# Patient Record
Sex: MALE
[De-identification: names, ages, dates, MRNs, and addresses within clinical notes are randomized per-mention and may not be internally consistent; named-entity substitution may affect disease eponyms.]

---

## 2019-06-21 ENCOUNTER — HOSPITAL ENCOUNTER (EMERGENCY)
Dept: HOSPITAL 97 - ER | Age: 64
Discharge: HOME | End: 2019-06-21
Payer: COMMERCIAL

## 2019-06-21 VITALS — DIASTOLIC BLOOD PRESSURE: 50 MMHG | OXYGEN SATURATION: 100 % | SYSTOLIC BLOOD PRESSURE: 156 MMHG

## 2019-06-21 VITALS — TEMPERATURE: 97.9 F

## 2019-06-21 DIAGNOSIS — E11.22: ICD-10-CM

## 2019-06-21 DIAGNOSIS — Z99.2: ICD-10-CM

## 2019-06-21 DIAGNOSIS — I12.0: ICD-10-CM

## 2019-06-21 DIAGNOSIS — D63.1: Primary | ICD-10-CM

## 2019-06-21 DIAGNOSIS — N18.6: ICD-10-CM

## 2019-06-21 DIAGNOSIS — Z79.4: ICD-10-CM

## 2019-06-21 LAB
ANISOCYTOSIS BLD QL: (no result)
BLD SMEAR INTERP: (no result)
BUN BLD-MCNC: 23 MG/DL (ref 7–18)
GLUCOSE SERPLBLD-MCNC: 186 MG/DL (ref 74–106)
HCT VFR BLD CALC: 21.1 % (ref 39.6–49)
HYPOCHROMIA BLD QL: (no result)
LYMPHOCYTES # SPEC AUTO: 1.1 K/UL (ref 0.7–4.9)
MACROCYTES BLD QL: (no result)
MORPHOLOGY BLD-IMP: (no result)
PMV BLD: 8.3 FL (ref 7.6–11.3)
POLYCHROMASIA BLD QL SMEAR: (no result)
POTASSIUM SERPL-SCNC: 3.2 MMOL/L (ref 3.5–5.1)
RBC # BLD: 1.96 M/UL (ref 4.33–5.43)

## 2019-06-21 PROCEDURE — 82272 OCCULT BLD FECES 1-3 TESTS: CPT

## 2019-06-21 PROCEDURE — 86900 BLOOD TYPING SEROLOGIC ABO: CPT

## 2019-06-21 PROCEDURE — 86850 RBC ANTIBODY SCREEN: CPT

## 2019-06-21 PROCEDURE — 86901 BLOOD TYPING SEROLOGIC RH(D): CPT

## 2019-06-21 PROCEDURE — 99283 EMERGENCY DEPT VISIT LOW MDM: CPT

## 2019-06-21 PROCEDURE — 36415 COLL VENOUS BLD VENIPUNCTURE: CPT

## 2019-06-21 PROCEDURE — 85025 COMPLETE CBC W/AUTO DIFF WBC: CPT

## 2019-06-21 PROCEDURE — 80048 BASIC METABOLIC PNL TOTAL CA: CPT

## 2019-06-21 NOTE — ER
Nurse's Notes                                                                                     

 [facility]                                                                 

Name: Kyle Chan                                                                                  

Age: 63 yrs                                                                                       

Sex: Male                                                                                         

: 1955                                                                                   

MRN: [id number]                                                                                   

Arrival Date: 2019                                                                          

Time: 10:12                                                                                       

Account#: [id number]                                                                            

Bed 6                                                                                             

Private MD: Dustin Baugh R                                                         

Diagnosis: Anemia in chronic kidney disease                                                       

                                                                                                  

Presentation:                                                                                     

                                                                                             

10:30 Presenting complaint: Patient states: was sent here from [facility] for Hgb=6.6, iw  

      is usually above 8, has increased weakness, also states he has a "lump" in his right        

      groin that is supposed to be removed on  at [employer], was told it could             

      possibly have a small leak in it. Transition of care: patient was not received from         

      another setting of care. Onset of symptoms was 2019. Risk Assessment: Do you       

      want to hurt yourself or someone else? Patient reports no desire to harm self or            

      others. Initial Sepsis Screen: Does the patient meet any 2 criteria? No. Patient's          

      initial sepsis screen is negative. Does the patient have a suspected source of              

      infection? No. Patient's initial sepsis screen is negative. Care prior to arrival: None.    

10:30 Method Of Arrival: Wheelchair                                                           iw  

10:30 Acuity: KJ 3                                                                           iw  

                                                                                                  

Triage Assessment:                                                                                

10:40 General: Appears in no apparent distress. comfortable, Behavior is cooperative,         bp  

      appropriate for age, anxious. Pain: Denies pain. EENT: No deficits noted. Neuro: Level      

      of Consciousness is awake, alert, obeys commands, Oriented to person, place, time,          

      situation, Appropriate for age. Cardiovascular: No deficits noted. Respiratory: Airway      

      is patent Respiratory effort is even, unlabored, Respiratory pattern is regular,            

      symmetrical. GI: No signs and/or symptoms were reported involving the gastrointestinal      

      system. : No signs and/or symptoms were reported regarding the genitourinary system.      

      Derm: No deficits noted. Musculoskeletal: Circulation, motion, and sensation intact.        

      Range of motion: intact in all extremities.                                                 

                                                                                                  

Historical:                                                                                       

- Allergies:                                                                                      

10:40 No Known Allergies;                                                                     iw  

- PMHx:                                                                                           

10:40 Dialysis; Hypertension; Hyperlipidemia; Diabetes - IDDM;                                iw  

- PSHx:                                                                                           

10:40 left foot;                                                                              iw  

                                                                                                  

- Immunization history:: Adult Immunizations up to date.                                          

- Social history:: Smoking status: Patient/guardian denies using tobacco.                         

- Ebola Screening: : Patient negative for fever greater than or equal to 101.5 degrees            

  Fahrenheit, and additional compatible Ebola Virus Disease symptoms Patient denies               

  exposure to infectious person Patient denies travel to an Ebola-affected area in the            

  21 days before illness onset No symptoms or risks identified at this time.                      

                                                                                                  

                                                                                                  

Screening:                                                                                        

10:45 Abuse screen: Denies threats or abuse. Denies injuries from another. Nutritional        bp  

      screening: No deficits noted. Tuberculosis screening: No symptoms or risk factors           

      identified. Fall Risk None identified.                                                      

                                                                                                  

Assessment:                                                                                       

10:40 General: SEE TRIAGE NOTE.                                                               bp  

12:13 Reassessment: ALL CURRENT ORDERS COMPLETED, RESULTS PENDING.                            bp  

13:00 Reassessment: CONSENT FOR PRBC SIGNED/WITNESSED, TRANSFUSION STARTED AT 1300. PT ON     bp  

      NIBP AND CONTINUOUS SPO2.                                                                   

14:00 Reassessment: PRBC TRANSFUSION IN PROCESS.                                              bp  

15:44 Reassessment: PT D/C HOME AMBULATORY WITH FAMILY, DX WITH ANEMIA IN CHRONIC KIDNEY      bp  

      DISEASE.                                                                                    

                                                                                                  

Vital Signs:                                                                                      

10:41  / 42; Pulse 62; Resp 16; Temp 97.9; Pulse Ox 99% on R/A; Weight 81.65 kg; Height iw  

      5 ft. 8 in. (172.72 cm); Pain 0/10;                                                         

12:13  / 50; Pulse 60; Resp 16; Pulse Ox 100% ;                                         bp  

13:00  / 39; Pulse 51; Resp 14; Temp 97.8; Pulse Ox 98% ;                               bp  

14:00  / 42; Pulse 52; Resp 14; Temp 97.8; Pulse Ox 99% ;                               bp  

15:44  / 57; Pulse 67; Resp 16; Temp 98; Pulse Ox 100% ;                                bp  

10:41 Body Mass Index 27.37 (81.65 kg, 172.72 cm)                                             iw  

                                                                                                  

ED Course:                                                                                        

10:12 Patient arrived in ED.                                                                  mr  

10:12 Dustin Baugh MD is Private Physician.                                    mr  

10:18 [facility]                                                               [id number] 

10:18 Dimitri Barbosa MD is Attending Physician.                                                jr8 

10:25 Jose Whipple, RN is Primary Nurse.                                                    bp  

10:38 Triage completed.                                                                       iw  

10:40 Arm band placed on.                                                                     iw  

10:45 Patient has correct armband on for positive identification. Bed in low position. Call   bp  

      light in reach. Side rails up X2. Adult w/ patient.                                         

10:45 Inserted saline lock: 20 gauge in right forearm, using aseptic technique. Blood         bp  

      collected.                                                                                  

15:28 Dustin Baugh MD is Referral Physician.                                   [email or web address] 

15:44 No provider procedures requiring assistance completed. IV discontinued, intact,         bp  

      bleeding controlled, No redness/swelling at site. Pressure dressing applied.                

                                                                                                  

Administered Medications:                                                                         

No medications were administered                                                                  

                                                                                                  

                                                                                                  

Outcome:                                                                                          

15:28 Discharge ordered by MD.                                                                [id number] 

15:45 Discharged to home ambulatory, with family.                                             bp  

15:45 Condition: stable                                                                           

15:45 Discharge instructions given to patient, Instructed on discharge instructions, follow       

      up and referral plans. Demonstrated understanding of instructions, follow-up care.          

15:46 Patient left the ED.                                                                    bp  

                                                                                                  

Signatures:                                                                                       

Jocelyne Bernabe Irene, RN                     RN   iw                                                   

Santiago Soto PA PA   jr8                                                  

Jose Whipple, RN                      RN   bp                                                   

                                                                                                  

**************************************************************************************************

## 2019-06-21 NOTE — XMS REPORT
Encounter CCD: 2013 to 2013

 Created on:2057



Patient:HOMER SOARES

Sex:Male

:1955

External Reference #:[id number]





Demographics







 Address  [address]



   [place] [address]-

 

 Home Phone  [phone]

 

 Preferred Language  Unknown

 

 Marital Status  

 

 Mosque Affiliation  Buddhist

 

 Race  Other

 

 Ethnic Group  









Author







 Organization  [facility]









Care Team Providers







 Name  Role  Phone

 

 Linda West  Referring Provider  [phone]









Allergies, Adverse Reactions, Alerts







 Substance  Reaction  Status

 

 NKDA    Active







Problem List







 Condition  Effective Dates  Status

 

 Diabetes    Resolved

 

 Hypertension    Resolved

 

 renal failure    Resolved







Results

BEDSIDE GLUCOSE TESTING





 Most recent to oldest [Reference Range]:  1

 

 Gluc POC Lifscn [70-99 mg/dL]  193 mg/dL 1



   *HI*



   (2013 08:40:00)

 

 Comment1  Notify RN/MD



   *NA*



   (2013 08:40:00)



1Interpretive Data:  Upper Reportable Limit: 200 mg/dL.CHEMISTRY





 Most recent to oldest [Reference Range]:  1

 

 POC Potassium [3.5-5.1 mEq/L]  5.4 mEq/L



   *HI*



   (2013 08:41:00)

## 2019-06-21 NOTE — EDPHYS
Physician Documentation                                                                           

 [facility]                                                                 

Name: Kyle Chan                                                                                  

Age: 63 yrs                                                                                       

Sex: Male                                                                                         

: 1955                                                                                   

MRN: [id number]                                                                                   

Arrival Date: 2019                                                                          

Time: 10:12                                                                                       

Account#: [id number]                                                                            

Bed 6                                                                                             

Private MD: Dustin Baugh R                                                         

ED Physician Dimitri Barbosa                                                                         

HPI:                                                                                              

                                                                                             

10:42 This 63 yrs old  Male presents to ER via Wheelchair with complaints of Abnormal jr8 

      Lab Results.                                                                                

10:42 Patient with history of CKD. Stated that at dialysis this morning they told him his H/H jr8 

      was low and to be evaluated. Stated that he had slight more fatigue this week then          

      normal. Denies BRBPR or melena. Severity of symptoms: At their worst the symptoms were      

      mild in the emergency department the symptoms are unchanged. It is unknown whether or       

      not the patient has had similar symptoms in the past. The patient has been recently         

      seen by a physician:.                                                                       

                                                                                                  

Historical:                                                                                       

- Allergies:                                                                                      

10:40 No Known Allergies;                                                                     iw  

- PMHx:                                                                                           

10:40 Dialysis; Hypertension; Hyperlipidemia; Diabetes - IDDM;                                iw  

- PSHx:                                                                                           

10:40 left foot;                                                                              iw  

                                                                                                  

- Immunization history:: Adult Immunizations up to date.                                          

- Social history:: Smoking status: Patient/guardian denies using tobacco.                         

- Ebola Screening: : Patient negative for fever greater than or equal to 101.5 degrees            

  Fahrenheit, and additional compatible Ebola Virus Disease symptoms Patient denies               

  exposure to infectious person Patient denies travel to an Ebola-affected area in the            

  21 days before illness onset No symptoms or risks identified at this time.                      

                                                                                                  

                                                                                                  

ROS:                                                                                              

10:42 Eyes: Negative for injury, pain, redness, and discharge, ENT: Negative for injury,      jr8 

      pain, and discharge, Neck: Negative for injury, pain, and swelling, Cardiovascular:         

      Negative for chest pain, palpitations, and edema, Respiratory: Negative for shortness       

      of breath, cough, wheezing, and pleuritic chest pain, Abdomen/GI: Negative for              

      abdominal pain, nausea, vomiting, diarrhea, and constipation, Back: Negative for injury     

      and pain, MS/Extremity: Negative for injury and deformity, Skin: Negative for injury,       

      rash, and discoloration, Neuro: Negative for headache, weakness, numbness, tingling,        

      and seizure.                                                                                

10:42 Constitutional: Positive for fatigue.                                                       

                                                                                                  

Exam:                                                                                             

10:42 Eyes:  Pupils equal round and reactive to light, extra-ocular motions intact.  Lids and jr8 

      lashes normal.  Conjunctiva and sclera are non-icteric and not injected.  Cornea within     

      normal limits.  Periorbital areas with no swelling, redness, or edema. ENT:  Nares          

      patent. No nasal discharge, no septal abnormalities noted.  Tympanic membranes are          

      normal and external auditory canals are clear.  Oropharynx with no redness, swelling,       

      or masses, exudates, or evidence of obstruction, uvula midline.  Mucous membranes           

      moist. Neck:  Trachea midline, no thyromegaly or masses palpated, and no cervical           

      lymphadenopathy.  Supple, full range of motion without nuchal rigidity, or vertebral        

      point tenderness.  No Meningismus. Cardiovascular:  Regular rate and rhythm with a          

      normal S1 and S2.  No gallops, murmurs, or rubs.  Normal PMI, no JVD.  No pulse             

      deficits. Respiratory:  Lungs have equal breath sounds bilaterally, clear to                

      auscultation and percussion.  No rales, rhonchi or wheezes noted.  No increased work of     

      breathing, no retractions or nasal flaring. Abdomen/GI:  Soft, non-tender, with normal      

      bowel sounds.  No distension or tympany.  No guarding or rebound.  No evidence of           

      tenderness throughout. Brown stool with a negative guaiac Back:  No spinal tenderness.      

      No costovertebral tenderness.  Full range of motion. Skin:  Warm, dry with normal           

      turgor.  Normal color with no rashes, no lesions, and no evidence of cellulitis. MS/        

      Extremity:  Pulses equal, no cyanosis.  Neurovascular intact.  Full, normal range of        

      motion. Neuro:  Awake and alert, GCS 15, oriented to person, place, time, and               

      situation.  Cranial nerves II-XII grossly intact.  Motor strength 5/5 in all                

      extremities.  Sensory grossly intact.  Cerebellar exam normal.  Normal gait.                

                                                                                                  

Vital Signs:                                                                                      

10:41  / 42; Pulse 62; Resp 16; Temp 97.9; Pulse Ox 99% on R/A; Weight 81.65 kg; Height iw  

      5 ft. 8 in. (172.72 cm); Pain 0/10;                                                         

12:13  / 50; Pulse 60; Resp 16; Pulse Ox 100% ;                                         bp  

13:00  / 39; Pulse 51; Resp 14; Temp 97.8; Pulse Ox 98% ;                               bp  

14:00  / 42; Pulse 52; Resp 14; Temp 97.8; Pulse Ox 99% ;                               bp  

15:44  / 57; Pulse 67; Resp 16; Temp 98; Pulse Ox 100% ;                                bp  

10:41 Body Mass Index 27.37 (81.65 kg, 172.72 cm)                                             iw  

                                                                                                  

MDM:                                                                                              

10:18 Patient medically screened.                                                             [facility] 

15:27 Data reviewed: vital signs, nurses notes, lab test result(s), and as a result, I will   jr8 

      discharge patient. Data interpreted: Pulse oximetry: on room air is 99 %.                   

      Interpretation: normal. Counseling: I had a detailed discussion with the patient and/or     

      guardian regarding: the historical points, exam findings, and any diagnostic results        

      supporting the discharge/admit diagnosis, lab results, the need for outpatient follow       

      up, a family practitioner, to return to the emergency department if symptoms worsen or      

      persist or if there are any questions or concerns that arise at home. Response to           

      treatment: the patient's symptoms have markedly improved after treatment, and as a          

      result, I will discharge patient.                                                           

                                                                                                  

                                                                                             

10:37 Order name: CBC with Diff                                                               [facility] 

                                                                                             

10:37 Order name: T\T\S                                                                         [facility]

                                                                                             

10:37 Order name: Basic Metabolic Panel                                                       [facility] 

                                                                                             

10:42 Order name: Bb Add On                                                                     

                                                                                             

10:47 Order name: Occult Blood--Ancillary                                                       

                                                                                             

11:11 Order name: Occult Blood--Ancillary; Complete Time: 11:59                               [facility]

                                                                                             

10:37 Order name: IV; Complete Time: 10:51                                                    [facility] 

                                                                                             

11:20 Order name: Basic Metabolic Panel; Complete Time: 11:59                                 [facility]

                                                                                             

11:25 Order name: CBC with Automated Diff; Complete Time: 12:21                               [facility]

                                                                                             

11:52 Order name: Type and Screen                                                             [facility]

                                                                                             

12:13 Order name: CBC Smear Scan; Complete Time: 12:21                                        [facility]

                                                                                             

12:54 Order name: ABO/RH no charge; Complete Time: 13:09                                      [facility]

                                                                                                  

Administered Medications:                                                                         

No medications were administered                                                                  

                                                                                                  

                                                                                                  

Disposition:                                                                                      

16:23 Co-signature as Attending Physician, Dimitri Barbosa MD.                                    rn  

                                                                                                  

Disposition:                                                                                      

19 15:28 Discharged to Home. Impression: Anemia in chronic kidney disease.                  

- Condition is Stable.                                                                            

- Discharge Instructions: Anemia, Nonspecific, Blood Transfusion, Adult.                          

                                                                                                  

- Medication Reconciliation Form, Thank You Letter, Antibiotic Education, Prescription            

  Opioid Use form.                                                                                

- Follow up: Dustin Baugh MD; When: 2 - 3 days; Reason: Recheck today's            

  complaints, Continuance of care, Re-evaluation by your physician.                               

- Problem is new.                                                                                 

- Symptoms have improved.                                                                         

                                                                                                  

                                                                                                  

                                                                                                  

Signatures:                                                                                       

Dispatcher [employer]                           EDVerona Frausto RN                     Dimitri Richards MD MD rn Roszak, Josh, PA                        PA   jr8                                                  

Jose Whipple RN                      RN   bp                                                   

                                                                                                  

Corrections: (The following items were deleted from the chart)                                    

15:46 15:28 2019 15:28 Discharged to Home. Impression: Anemia in chronic kidney         bp  

      disease. Condition is Stable. Forms are Medication Reconciliation Form, Thank You           

      Letter, Antibiotic Education, Prescription Opioid Use. Follow up: Dustin Baugh; When: 2 - 3 days; Reason: Recheck today's complaints, Continuance of care,     

      Re-evaluation by your physician. Problem is new. Symptoms have improved. jr8                

                                                                                                  

**************************************************************************************************

## 2019-06-21 NOTE — XMS REPORT
Continuity of Care Document

 Created on:May 7, 2018



Patient:HOMER SOARES

Sex:Male

:1955

External Reference #:[id number]





Demographics







 Address  [place] [address]

 

 Phone  [phone]

 

 Preferred Language  Unknown

 

 Marital Status  Unknown

 

 Worship Affiliation  Unknown

 

 Race  Unknown

 

 Ethnic Group  Unknown









Author







 Organization  [facility]









Problems







 Problem  Status  Onset  Classification  Date  Comments  Source



     Date    Reported    



             



             



             

 

 LABH  Active  20        [address]



             



             

 

 LAB  Active  20        [address]



             



             

 

 ABN STRESS TEST,  Active  20        [facility]



 CLAUDICATION    17        Medical



             Center



             



             

 

 CCL/LHC W/  Active  20        [facility]



 POSS/BILATERAL    [address] [place] [address] EXTREMIT            Center



             



             

 

 ESRD  Active  20        [address]



             



             

 

 BDDC/  Active  20        [address]



             



             

 

 COLONOSCOPY  Active  20        [facility]



 SCREENING    [address] [place] [address]



             Center



             



             

 

 UPDATE VISIT  Active  11/15/20        [address]



             



             

 

 CLAUDICATION  Active  20        [address]



             Center



             



             

 

 FOLLOW UP  Active  20        [address]



             



             

 

 RENAL PRE OPEN  Active  20        [facility]



 ACCT    15        [facility]



             Center



             



             

 

 CARDIAC CLEARNCE/  Active  20        [facility]



 PRE KIDNEY    15        Medical



 TRANSPLANT            Center



             



             

 

 PA KIDNEY ACCT  Active  20        [facility]



 FOR FC NOTES    15        Medical



 [place]            Center



             



             

 

 T-SPOT  Active  20        [address]



             



             

 

 ESRD**INCLUDE  Active  20        [facility]



 ILIACS**    14        Medical



             Center



             



             

 

 ESRD/ PRE  Active  20        [facility]



 TRANSPLANT WORK    14        Medical



 UP/  INCLUDE E            Center



             



             

 

 Discharge    04/08/20    04/10/2014    [facility]



 Diagnosis:    14        Medical



 Hypertension            Center



             



             

 

 HIGH BLOOD  Active  20        [facility]



 PRESSURE    14        Medical



             Center



             



             

 

 UPDATE  Active  20        [address]



             Center



             



             

 

 BDDC-RECTAL  Active  20        [facility]



 CANCER SCREENING    13        [facility]



             Center



             



             

 

 COLONSCOPY  Active  20        [facility]



 CLEARANCE    13        Medical



             Center



             



             

 

 KIDNEY TX/CARDIAC  Active  20        [facility]



 CLEARENCE PER    13        Medical



 JOSESITO GR            Center



             



             

 

 RENAL/DONOT USE  Active  20        [facility]



 THIS ACCT FOR    13        Medical



 CHARGES F/            Center



             



             

 

 PRE TRANSPLANT  Active  20        [facility]



 EVAL    [address] [place] [address]



             Center



             



             

 

 Pulmonary  Active  20  Problem  2017    [facility]



 hypertension            Medical



             Center,



             OP[facility]



             [facility]



             



             

 

 Diabetes  Resolved    Problem  2013    [facility]



             



             

 

 Hypertension  Resolved    Problem  2013    [facility]



             



             

 

 renal failure  Resolved    Problem  2015    [facility]



             



             

 

 DM II [Diabetes  Active    Problem  2016    [facility]



 mellitus type II]            Medical



             Center



             



             

 

 Essential  Active    Problem  2015    [facility]            Center



             



             

 

 Kidney transplant  Active    Problem  2017    [facility]



 evaluation            [facility],



             OFEZAKI [facility]



             



             

 

 Coronary artery  Active    Problem  2017    [facility]            Medical



             [place],



             OPIZAKI [facility]



             



             

 

 End-stage renal  Active    Problem  2017    [facility]



 disease (<span            Medical



 ID="[id number]"            Center,



 >Confirmed</span>            [facility]



 )            [facility]



             



             

 

 Essential  Active    Problem  2016    [facility]            Center



             



             

 

 History and  Active    Problem  2017    [facility]



 physical            Medical



 examination,            Center,



 annual for health            [facility]



 maintenance            [facility]



             



             

 

 Obesity  Active    Problem  2017    [facility],



             SANTIAGO [facility]



             



             

 

 Polyp of colon,  Active    Problem  2017    [facility],



             OPIZAKI [facility]



             



             

 

 DM II [Diabetes  Active    Problem  2017     [facility]



 mellitus type II]            [facility]



             



             

 

 Essential  Active    Problem  2017     [facility]



 pulmonary            [facility]



             



             

 

 SCREEN MAL  Active          [facility]



 NEOP-RECTUM            [facility]



             



             

 

 PRE-PROCEDURE LAB  Active          [facility]



             



             

 

 RENAL FAILURE NOS  Active          [facility]



             



             

 

 MEDICAL SERVICES  Active          [facility]



 NOT AVAILABLE IN            Medical



 HOME            Center



             



             

 

 PERIPHERAL  Active          [facility]



 VASCULAR DISEASE,            Medical



 UNSPECIFIED            Center



             



             

 

 ENCNTR FOR  Active          [facility]



 GENERAL ADULT            Medical



 MEDICAL EXAM W/            Center



             



             

 

 ENCOUNTER FOR  Active          [facility]



 PREPROCEDURAL            Medical



 LABORATORY E            Center



             



             







Medications







 Medication  Details  Route  Status  Patient  Ordering  Order  Source



         Instructions  Provider  Date  



               



               



               

 

 Aspirin Enteric  81 mg=1    Active        [facility]



 Coated 81 mg oral  tab, PO,          017  Medical



 delayed release  Daily, 0            Center



 tablet  Refill(s)            



               



               

 

 Famotidine  40 mg, PO,    Active        [facility]



   BID, # 60          017  Medical



   tab, 0            Center



   Refill(s)            



               



               

 

 famotidine 20  0 Refill(s)    Inactive        [facility]



 mg/5 mL-NaCl 0.9%            017  Medical



 intravenous              Center



 solution              



               

 

 GoLYTELY oral  See    Active        [facility]



 powder for  Instruction          017  Medical



 reconstitution  s, Take as            Center



   directed by            



   physician.,            



   # 1 ea, 0            



   Refill(s),            



   Pharmacy:            



   [facility]            



   Pharmacy            



   [id number]            



               



               

 

 valsartan 320 mg  320 mg=1    Active        [facility]



 oral tablet  tab, PO,          017  Medical



   Daily, # 30            Center



   tab, 0            



   Refill(s)            



               



               

 

 non-formulary  1 dose,    Active        [facility]



   Daily,          017  Medical



   ecotrin,            Center



   Refill(s) 0            



               



               

 

 24 HR Nifedipine  60 mg=1    Active         [place]



 60 MG Extended  tab, PO,          017  Medical



 Release Tablet  Daily, # 30            Center



   tab, 0            



   Refill(s)            



               



               

 

 NPH Insulin,  See Special    Active        [facility]



 Human 100 UNT/ML  Instruction          017  Medical



 Injectable  s, SUB-Q,            Center



 Suspension  BID, Inject            



 [Novolin N]   12 units            



   at 9am and            



   inject 8            



   units at            



   9pm, vial,            



   0 Refill(s)            



               



               

 

 minoxidil 2.5 mg  5 mg=2 tab,    Active        [facility]



 oral tablet  PO, BID, 0          017  Medical



   Refill(s)            Center



               



               

 

 Famotidine 20 MG  20 mg=1    Active        [facility]



 Oral Tablet  tab, PO,          017  Medical



   BID, 0            Center



   Refill(s)            



               



               

 

 labetalol 200 mg  3 tabs, PO,    Active        [facility]



 oral tablet  BID, 0          017  Medical



   Refill(s)            Center



               



               

 

 24 HR Nifedipine  60 mg=1    Active        [facility]



 60 MG Extended  tab, PO,          017  Medical



 Release Tablet  BID, 0            Center



   Refill(s)            



               



               

 

 clopidogrel 75 MG  75 mg=1    Active        [facility]



 Oral Tablet  tab, PO,          017  Medical



 [Plavix]  Daily, 0            Center



   Refill(s)            



               



               

 

 tramadol  See    Active        [facility]



 hydrochloride 50  Instruction          016  Medical



 MG Oral Tablet  s, 1 tab in            Center



   am and 1            



   tab in pm,            



   0 Refill(s)            



               



               

 

 NPH Insulin,  See    Active        [facility]



 Human 100 UNT/ML  Instruction          016  Medical



 Injectable  s, takes 15            Center



 Suspension  units in AM            



 [Humulin N]  and 10units            



   in PM, 0            



   Refill(s)            



               



               

 

 amLODIPine 10 mg  10 mg=1    Active        [facility]



 oral tablet  tab, PO,          016  Medical



   Daily, 0            Center



   Refill(s)            



               



               

 

 lovastatin 20 mg  20 mg=1    Active        [facility]



 oral tablet  tab, PO,          016  Medical



   Daily, 0            Center



   Refill(s)            



               



               

 

 Hydralazine  100 mg=1    Active         [place]



 Hydrochloride 100  tab, PO,          016  Medical



 MG Oral Tablet  TID, 0            Center



   Refill(s)            



               



               

 

 valsartan 320 MG  320 mg=1    Active         [facility]



 Oral Tablet  tab, PO,          016  Medical



 [Diovan]  Daily, 0            Center



   Refill(s)            



               



               

 

 labetalol 200 mg  See    Active         [facility]



 oral tablet  Instruction          015  Medical



   s, 3 tab PO            Center



   bid, 0            



   Refill(s)            



               



               

 

 gabapentin 300 MG  300 mg=1    Active         [facility]



 Oral Capsule  cap, PO,          015  Medical



   Daily, # 90            Center



   cap, 0            



   Refill(s)            



               



               

 

 valsartan 320 mg  320 mg=1    Active         [facility]



 oral tablet  tab, PO,          015  Medical



   Daily, # 30            Center



   tab, 0            



   Refill(s)            



               



               

 

 calcium acetate  1,334 mg=2    Active         [facility]



 667 MG Oral  cap, PO,          015  Medical



 Capsule [Phoslo]  TID, with            Center



   meals and 2            



   tabs po            



   with            



   snacks, 0            



   Refill(s)            



               



               

 

 omeprazole 20 mg  20 mg=1    Active        [facility]



 oral delayed  cap, PO,          014  Medical



 release capsule  Daily, # 30            Center



   cap, 1            



   Refill(s)            



               



               

 

 labetalol 200 mg  200 mg=1    Active         [facility]



 oral tablet  tab, PO,          014  Medical



   BID, # 60            Center



   tab, 1            



   Refill(s)            



               



               

 

 Labetalol  200 mg, 1    Inactive         [facility]



   tab, Route:          014  Medical



   PO, Drug            Center



   form: TAB,            



   ONCE,            



   Dosing            



   Weight            



   86.818, kg,            



   Start date:            



   14            



   16:23:00,            



   Stop date:            



   14            



   16:23:00Not            



   es: With            



   food. (Same            



   as:Trandate            



   ,            



   Normodyne)            



               



               

 

 Hydralazine  1 tab,    Inactive         [facility]



 Hydrochloride 100  Route: PO,          014  Medical



 MG Oral Tablet  ONCE,            Center



   Dosing            



   Weight            



   86.818, kg,            



   Start date:            



   14            



   16:23:00,            



   Stop date:            



   14            



   16:23:00            



               



               

 

 Clonidine  1 tab,    Inactive         [facility]



 Hydrochloride 0.3  Route: PO,          014  Medical



 MG Oral Tablet  ONCE,            Center



   Dosing            



   Weight            



   86.818, kg,            



   Start date:            



   14            



   16:22:00,            



   Stop date:            



   14            



   16:22:00            



               



               







Allergies, Adverse Reactions, Alerts







 Substance  Category  Reaction  Severity  Reaction  Status  Date  Comments  
Source



         type    Reported    



                 



                 







Immunizations







 Immunization  Date Given  Site  Status  Last Updated  Comments  Source



             



             







Results







 Order Name  Results  Value  Reference  Date  Interpretation  Comments  Source



       Range        



               



               



               

 

 Chest wo  Chest wo  EXAM: CT CHEST WITHOUT CONTRAST        -  [facility]



 contrast CT  contrast CT      /2017    -  Medical



               Center



               



     DATE: 2017        Read by:  Nisa Germain MD  



             Dictated Date/time:  17 08:16  



             Electronically Signed by:  Nisa Germain MD            
   17 08:30  



             FINAL REPORT  



     INDICATION: ESRD; PRE-TRANSPLANT WORK-UP          



               



               



               



     TECHNIQUE: Volumetric CT acquisition of the chest without contrast. Axial, 
sagittal and coronal reconstructions. Axial MIP images were reformatted at the 
scanner workstation.          



               



     IV contrast: None.          



               



     DLP: 812 mGy-cm          



               



               



               



     COMPARISON: Chest CT dated 2015          



               



               



               



     DISCUSSION:          



               



               



               



     Lines and Tubes: None.          



               



               



               



     Heart and Great Vessels: There is atherosclerotic calcification of the 
thoracic aorta right and left coronary arteries including the left anterior 
descending and left circumflex coronary arteries. Cardi          



     othoracic ratio measures 12.5/29 cm. There is no pleural or pericardial 
effusion.          



               



               



               



     Lymph Nodes: No hilar, mediastinal, axillary or internal mammary 
lymphadenopathy.          



               



               



               



     Lungs:  4 mm left upper lobe nodule on axial image 37, 3 mm right middle 
lobe nodule on axial image 108, 3 mm right lower lobe nodule on axial image 120 
and 2 mm right lower lobe nodule on axial image 1          



     35. These nodules are all stable since 2015. No new pulmonary 
nodules or masses.          



               



               



               



     Trachea and central bronchi are unremarkable.          



               



               



               



     Upper abdomen:  For comments below the diaphragm, I refer you to the 
abdomen and pelvic CT report from 2017.          



               



               



               



     Bones and Soft Tissues: Degenerative changes of the thoracic spine are 
noted with osteophytes.          



               



               



               



     IMPRESSION:          



               



               



               



     1.  4 mm left upper lobe nodule on axial image 37, 3 mm right middle lobe 
nodule on axial image 108, 3 mm right lower lobe nodule on axial image 120 and 
2 mm right lower lobe nodule on axial image 135.          



     These nodules are all stable since 2015. No new pulmonary nodules or 
masses.          



               



               



               



     2. Atherosclerotic calcification of the thoracic aorta right and left 
coronary arteries including the left anterior descending and left circumflex 
coronary arteries.          



               



               



               



               

 

 HEMATOLOGY  POC  39.0 %  42.0 -        [facility]



   Hematocrit    54.0        [facility]



               



               



               

 

 HEMATOLOGY  POC Potassium  3.6 meq/L  3.5 - 5.1        [facility]      [facility]



               



               



               

 

 HEMATOLOGY  POC Sodium  133 meq/L  135 - 145        [address]



               



               



               

 

 HEMATOLOGY  POC  13.3 g/dL  14.0 -        [facility]



   Hemoglobin    18.0        [facility]



               



               



               

 

 HEMATOLOGY  POC Glucose  122 mg/dL  70 - 99        [address]



               



               



               

 

 Abdomen/Pel  Abdomen/Pelvi  EXAM:  CT ABDOMEN AND PELVIS WITHOUT CONTRAST        -   [facility]



 vis wo IV  s wo IV          -  [facility]



 contrast CT  contrast CT            



               



     DATE: 2017 1:19 PM CST        Read by:  Faheem Bonilla MD  



             Dictated Date/time:  17 14:12  



             Electronically Signed by:  Faheem Bonilla MD              
   17 14:22  



             FINAL REPORT  



     INDICATION: Z01.818   Encounter for other preprocedural examination. Renal 
transplant evaluation.          



               



               



               



     ADDITIONAL INFORMATION: None.          



               



               



               



     COMPARISON: CT abdomen pelvis 2014 and renal ultrasound 2015  
        



               



               



               



     TECHNIQUE: Volumetric CT acquisition of the abdomen and pelvis without the 
intravenous administration contrast. Axial, coronal and sagittal 
reconstructions.          



               



               



               



               



               



     IV CONTRAST: None          



               



               ORAL CONTRAST: Water          



               



               RADIATION DOSE: Total DLP: 1172 mGy*cm          



               



                         Estimated effective dose: DLP x 0.015 mSv          



               



              COMPLICATIONS: None          



               



               



               



     FINDINGS:          



               



               



               



     Lines and tubes: None.          



               



               



               



     Lower thorax: Lung bases are clear. No pleural effusions. The heart is 
within normal limits in size. Coronary artery calcifications.          



               



               



               



     Liver: Mild pneumobilia is suggestion of a Riedel's lobe. A too small to 
characterize hypodensity within the right hepatic lobe.          



               



               



               



     Biliary tree: No intra- or extrahepatic biliary ductal dilation.          



               



               



               



     Gallbladder: Subtle hyperdensity may suggest tiny stones/sludge. No wall 
thickening or pericholecystic fluid.          



               



               



               



     Pancreas: Atrophic pancreas without ductal dilatation. No peripancreatic 
stranding.          



               



               



               



     Spleen: No splenomegaly.          



               



               



               



     Adrenals: No nodules.          



               



               



               



     Kidneys and ureters: Bilateral atrophic kidneys with prominent stranding 
but without fluid collections. No renal stones or hydronephrosis. No obvious 
cystic lesions.          



               



               



               



     No hydroureter.          



               



               



               



     Bladder: Bladder collapsed without radiopaque stones. Prominent prostate. 
         



               



               



               



     Gastrointestinal tract: Small hiatal hernia. The stomach is partially 
underdistended limiting optimal evaluation. No small or large bowel 
obstruction. Visualized appendix is within normal limits in size          



      without right lower quadrant fluid collections stranding. Moderate stool 
burden.          



               



               



               



     Peritoneum and retroperitoneum: No organized fluid collections or free 
air. Mild mesenteric stranding, not significant changed from prior exam.       
   



               



               



               



               



               



     Lymph nodes: Prominent periportal lymph node unchanged. No other 
lymphadenopathy.          



               



               



               



     Vasculature: IVC and abdominal aorta within emilio limits in caliber. 
Moderate vascular calcifications of the abdominal aorta and visceral branches. 
         



               



               



               



     Iliac vessels: Scattered vascular calcifications of the bilateral common 
iliac arteries. Scattered vascular calcifications of the bilateral distal 
external iliac arteries. Prominent calcifications of th          



     e bilateral internal iliac arteries. The iliac vessels are within normal 
limits in size without aneurysmal dilatation.          



               



               



               



     Bones: Multilevel degenerative changes of the thoracolumbar and 
lumbosacral spine with vacuum phenomena on the lumbosacral spine. Facet 
arthrosis.          



               



               



               



     Soft tissues: Small bilateral fat-containing inguinal hernias. Small fat-
containing umbilical hernia with diastasis of the rectus abdominis muscle. 
Nonspecific soft tissue stranding and edema.          



               



               



               



     IMPRESSION:          



               



     1.  Bilateral kidneys with persistent moderate perinephric stranding 
likely related to chronic renal disease.          



               



     2.  Persistent prominent periportal lymph node. No other lymphadenopathy. 
         



               



     3.  Bilateral iliac arteries and branches as detailed. No aneurysmal 
dilatation.          



               



     4.  Hepatomegaly.          



               



     5.  No bowel obstruction. Moderate stool burden.          



               



               



               



               

 

 REFERENCE  HLA Misc Test  See Report 1        Result  [facility]



 LAB RESULTS            Comment:  Medical



     (17 9:50 AM)        Reference lab  Center



             results  



             scanned in  



             [facility].  



             Results  



             displayed in  



             Results-Lab-R  



             [facility]  



             LAB-Outside  



             Lab Documents  



             (Imaged)  



             under  



             date/time  



             results were  



             scanned.  



             Report sent  



             for scanning  



             on  



             2017.  



               



               

 

 REFERENCE  Test Name  PRA          [facility]



 LAB RESULTS    RESULTS    /2017      Medical



               Center



               



               



               

 

 HVI VAS  HVI VAS  Reason for exam: Peripheral arterial disease        -  
[facility]



 Arterial  Arterial      /2016    -  Medical



 Upper or  Upper or            Center



 Lower  Lower single            



 single l  l  IMPRESSION:        Read by:  Cornado Pavon MD  



             Dictated Date/time:  01/15/16 11:12  



             Electronically Signed by:  Conrado Pavon MD                      
   01/15/16 11:14  



             FINAL REPORT  



     1. On the right the ankle/brachial index is 0.94.          



               



     2. On the left the ankle/brachial index is nonobtainable.          



               



               



               



     COMMENT:          



               



     Bilateral lower extremities segmental pressures with velocity waveform 
analysis was performed.          



               



               



               



     On the right, primarily triphasic waveforms are noted in the posterior 
tibial, and dorsalis pedis artery.  The right toe brachial index is 0.69.      
    



               



               



               



     On the left, primarily triphasic waveforms are noted in the posterior 
tibial, and dorsalis pedis artery.  The left toe brachial index is 0.68.       
   



               



               

 

 Retroperito  Retroperitone  EXAM: RENAL ULTRASOUND.      [facility]



 precious  al Complete      /    -  [facility]



 Complete US  US          This report was dictated by a Radiology Resident/
Fellow.  I have personally reviewed the images as  Center



             well as the Resident's interpretation and agree with the findings.
  



     DATE: 2015 at 1449 hours.        Read by:  Iban Mueller MD
            Resident:  Iban Mueller MD  



             Dictated Date/time:  12/22/15 14:56  



             Electronically Signed by:  Valeria Alcantar MD            
   12/22/15 16:28  



             FINAL REPORT  



     INDICATION: End-stage renal disease, pretransplant workup.          



               



               



               



     COMPARISON: CT abdomen pelvis 2014.          



               



               



               



     TECHNIQUE: Multiplanar sonographic imaging of the retroperitoneal 
structures was performed with Doppler and submitted for interpretation.        
  



               



               



               



     DISCUSSION:          



               



               



               



     The visualized portions of the liver appear normal.          



               



               



               



     The right kidney measures 9.4 x 4.6 x 5 cm and the left kidney measures 
9.2 x 4.9 x 4.4 cm. The kidneys are small and echogenic and demonstrate areas 
of cortical thinning. No cysts, masses, or calcifica          



     tions are seen. No hydronephrosis or hydroureter is present.          



               



               



               



     The bladder is collapsed and not evaluated.          



               



               



               



     IMPRESSION:          



               



     Bilateral atrophic kidneys without masses or acute abnormality.          



               



               

 

 Chest wo  Chest wo  CT CHEST WITHOUT CONTRAST 2015 10:57:00        
[facility]



 contrast CT  contrast CT      /2015    -  [facility]



               



     COMPARISON: Chest x-ray from 3/11/2014        Read by:  Eric Johns  



             Dictated Date/time:  12/22/15 11:46  



             Electronically Signed by:  Eric Johns           
   12/22/15 11:59  



             FINAL REPORT  



     CLINICAL INDICATION: pre transplant workup          



               



               



               



     TECHNIQUE: The chest CT was performed without intravenous contrast. The 
chest was scanned from apices to bases.  Reformatted sagittal and coronal 
images were obtained and reviewed.          



               



               



               



     FINDINGS:          



               



               



               



     MEDIASTINUM/ANNMARIE/VESSELS: The heart size is normal and there is no 
pericardial effusion. There are three-vessel coronary artery calcifications. 
Thoracic aortic calcifications are also present. Scattered          



      subcentimeter mediastinal lymph nodes, majority of which demonstrate a 
fatty hilum, benign finding. Lack of intravenous contrast limits evaluation for 
hilar lymphadenopathy.          



               



               



               



     LUNGS/PLEURA: Mucoid secretions are seen in the trachea and left mainstem 
bronchus. 2 mm nodule in the right lower lobe on axial image 99. 3 mm nodule in 
the right lower lobe on axial image 90 There is          



     no pathologic pulmonary nodule or mass identified. A few scattered 
calcified granulomas are present. No pleural effusion or pneumothorax.          



               



               



               



     BONES/SOFT TISSUES: Degenerative changes of the thoracic spine. Bilateral 
gynecomastia.          



               



               



               



     UPPER ABDOMEN: Limited evaluation without acute focal abnormality.        
  



               



               



               



     CONCLUSION:          



               



     1. No acute intrathoracic abnormality.          



               



     2. Few scattered noncalcified less than 4 mm pulmonary nodules do not 
require future followup unless the patient has clinical risk factors such as 
smoking history. Scattered bilateral calcified granulomas.          



               



     3. Three-vessel coronary artery calcifications.          



               



               

 

 REFERENCE  HLA Misc Test  See Report 1        Result  [facility]



 LAB RESULTS            Comment:  Medical



     (11/19/15 5:15 PM)        Reference lab  Center



             results  



             scanned in  



             [facility].  



             Results  



             displayed in  



             Results-Lab-R  



             [facility]  



             LAB-Outside  



             Lab Documents  



             (Imaged)  



             under  



             date/time  



             results were  



             scanned.  



             Report sent  



             for scanning  



             on  



             2015.  



               



               

 

 REFERENCE  Test Name  HLA TYPING          [facility]



 LAB RESULTS        /      Medical



               Center



               



               



               

 

 Abdomen/Pel  Abdomen/Pelvi  INDICATION: Abdominal pain. Chronic renal 
insufficiency.        -  [facility]



 vis wo IV  s wo IV      /    -  Medical



 contrast CT  contrast CT            Center



               



     PROCEDURE:  CT of the abdomen and pelvis was acquired with a multidetector 
scanner.  No I.V. or enteric contrast was present for the exam.  Axial, 
sagittal and coronal images were evaluated..        Read by:  Lawrence Ash MD  



             Dictated Date/time:  14 14:10  



             Electronically Signed by:  Lawrence Ash MD                  
   14 14:16  



             FINAL REPORT  



     FINDINGS: The liver is slightly prominent in size measuring 19.1 cm in 
span. The right lobe is rather elongated suggesting a Riedel's lobe. No focal 
masses is present in the liver, and the density is normal.          



               



               



               



     The gallbladder has normal wall thickness with no stone or sludge.        
  



               



               



               



     The pancreas has no inflammatory change, ductal dilatation or focal mass. 
         



               



               



               



     The spleen is normal.          



               



               



               



     There are no adrenal masses.          



               



               



               



     The kidneys are normal in span bilaterally with no appreciable cortical 
thickness. There is mild perinephric stranding bilaterally. No focal mass is 
present in either kidney, and there are no calcificat          



     ions. There is no hydronephrosis. The ureters are unremarkable. The 
urinary bladder was only minimally distended. The walls are grossly normal.    
      



               



               



               



     The stomach, small bowel, appendix and colon are unremarkable except for 
scattered colonic diverticula without diverticulitis.          



               



               



               



     The aorta has moderately heavy calcifications. These extend into the iliac 
arteries and common femoral arteries.          



               



               



               



     The lung bases are clear.          



               



               



               



     There are mild degenerative changes in the spine and no suspicious lytic 
or blastic lesions are present.          



               



               



               



     IMPRESSION:          



               



     1. The increase in the liver is likely a normal variant. The punctate 
calcification likely represent a calcified granuloma.          



               



     2. The perinephric stranding bilaterally is possibly related renal 
insufficiency. Correlate for any active inflammatory or infectious process.    
      



               



     3. Mildly prominent or scarring changes are present in the aorta and some 
mid sized arteries.          



               



               

 

 Abdomen  Abdomen  EXAM: US ABDOMEN COMPLETE        -  [facility] US  complete US      /    -  Medical



             This report was dictated by a Radiology Resident/Fellow.  I have 
personally reviewed the images as  Center



             well as the Resident's interpretation and agree with the findings.
  



     DATE: 2014 at 1138.        Read by:  Eric Purdy MD       
     Resident:  Eric Purdy MD  



             Dictated Date/time:  14 13:54  



             Electronically Signed by:  Keith Betts MD                    
   14 17:39  



             FINAL REPORT  



     INDICATION: Pretransplant workup.          



               



               



               



     ADDITIONAL INFORMATION: End-stage renal disease.          



               



               



               



     COMPARISON: CT abdomen pelvis without contrast 2013.          



               



               



               



     TECHNIQUE: Multiplanar grayscale and color Doppler ultrasound images of 
the abdomen were obtained.          



               



               



               



     FINDINGS:          



               



               



               



     Liver demonstrates heterogenous echogenicity without masses. Right hepatic 
lobe measures 16.1 cm at the midclavicular line. Main portal vein is 1.2 cm 
with hepatopetal flow.          



               



               



               



     Gallbladder normal without gallstones. Gallbladder wall thickness is 2.5 
mm. No sonographic Merchant&apos;s sign or pericholecystic fluid.          



               



               



               



     Visualized portions of the intrahepatic biliary tree are of normal 
caliber. Common duct is 4.8 mm.          



               



               



               



     Spleen measures 10.4 x 4.1 x 4.5 cm.          



               



               



               



     Pancreas is unremarkable where visualized.          



               



               



               



     The kidneys are echogenic, compatible with medical renal disease. No 
masses or hydronephrosis. Right kidney measures 10.4 x 3.9 x 4.9 cm. Left 
kidney measures 10.8 x 4.9 x 4.2 cm.          



               



               



               



     Abdominal aorta is of normal caliber.  Inferior vena cava unremarkable 
where visualized.          



               



               



               



     No significant free fluid identified.          



               



               



               



     IMPRESSION:          



               



               



               



     1. Heterogenous liver without focal lesions.          



               



     2. Mildly echogenic kidneys compatible with medical renal disease.        
  



               



               

 

 CHEM PANEL  eGFR  6        1Result Comment: The eGFR is calculated using 
the CKD-EPI formula. In most young, healthy individuals the eGFR will be >90 mL/
min/1.73m2. The eGFR declines with age. An eGFR of 60-89 may be normal in  [facility] 
[place]



     mL/min/1.    some populations, particularly the elderly, for 
whom the CKD-EPI formula has not been extensively validated. Use of the eGFR is 
not recommended in the following populations:  [place] [address]          Center



             Individuals with unstable creatinine concentrations, including 
pregnant patients and those with serious co-morbid conditions.  



               



             Patients with extremes in muscle mass or diet.  



               



             The data above are obtained from the National Kidney Disease 
Education Program (NKDEP) which additionally recommends that when the eGFR is 
used in patients with extremes of body mass index for purposes  



             of drug dosing, the eGFR should be multiplied by the estimated 
BMI.  

 

 CHEM PANEL  AGAP  17.3 meq/L  10.0 -        [facility]



       20.0        [facility]



               



               



               

 

 CHEM PANEL  Calcium Lvl  8.7 mg/dL  8.5 - 10.5         [place]



         /2014      [facility]



               



               



               

 

 CHEM PANEL  CO2  26 meq/L  24 - 32         [place]



         /2014      [facility]



               



               



               

 

 CHEM PANEL  Chloride Lvl  103 meq/L  95 - 109         [place]



         /2014      [facility]



               



               



               

 

 CHEM PANEL  Sodium Lvl  142 meq/L  135 - 145         [place]



         /2014      [facility]



               



               



               

 

 CHEM PANEL  Potassium Lvl  4.3 meq/L  3.5 - 5.1         [place]



         /2014      [facility]



               



               



               

 

 CHEM PANEL  Glucose Lvl  162 mg/dL  70 - 99      2Interpretive Data: 
Adult reference range values reflect the clinical guidelines   [place]



         /2014    of the American Diabetes Association.  [facility]



               



               



               

 

 CHEM PANEL  Creatinine  8.9 mg/dL  0.5 - 1.4        [facility]



   Lvl      /2014      Medical



               Center



               



               



               

 

 CHEM PANEL  BUN  51 mg/dL  7 - 22         [place]



         /2014      Medical



               Center



               



               



               

 

 Chest 2  Chest 2 views  EXAM: XR CHEST 2 VIEWS        -  [facility]



     -  Medical



             This report was dictated by a Radiology Resident/Fellow.  I have 
personally reviewed the images as  Center



             well as the Resident's interpretation and agree with the findings.
  



     DATE: 3/11/2014 at 1249 hours        Read by:  Rhett Gonsalves        
          Resident:  Rhett Gonsalves  



             Dictated Date/time:  14 13:34  



             Electronically Signed by:  Lane Yu MD      
   14 17:03  



             FINAL REPORT  



     INDICATION: ESRD; PRE-TRANSPLANT WORK-UP          



               



               



               



     COMPARISON: 2013 at 0916 hours          



               



               



               



     TECHNIQUE: Frontal and lateral chest radiographs          



               



               



               



     DISCUSSION: There has been interval removal of the right-sided internal 
jugular dialysis catheter. The cardiomediastinal silhouette is normal. The 
costophrenic angles are sharp no pneumothorax identifie          



     d. The lungs are clear. The bones and soft tissues are unremarkable.      
    



               



               



               



     IMPRESSION:  Normal chest radiograph.          



               



               



               



               

 

 CHEMISTRY  POC Potassium  5.4 meq/L  3.5 - 5.1    [facility]



         /      Medical



               Center



               



               



               

 

 BEDSIDE  Comment1  Notify      NA    [facility]



 GLUCOSE    RN/MD    /      Medical



 TESTING              Center



               



               



               

 

 BEDSIDE  Gluc POC  193 mg/dL  70 - 99    HI  1Interpretive  [facility]



 GLUCOSE  [employer]      /    Data:  Medical



 TESTING            [facility]  Center



             Upper  



             Reportable  



             Limit: 200  



             mg/dL.  



               



               







Vital Signs







 Vital Sign  Value  Date  Comments  Source



         

 

 Temperature Oral (F)  97.4 F  2017    [facility]



         

 

 BMI Calculated  30.97  2017    [facility]



         

 

 Weight  92.386  2017    [facility]



         

 

 Height  172.72 cm  2017    [facility]



         

 

 Respitory Rate  20  2017    [facility]



         

 

 Heart Rate  64  2017    [facility]



         

 

 Systolic (mm Hg)  146  2017    [facility]



         

 

 Diastolic (mm Hg)  66  2017    [facility]



         

 

 Height  172.72 cm  2017    [facility]



         

 

 BMI Calculated  31.24  2017    [facility]



         

 

 Weight  93.182  2017    [facility]



         

 

 Heart Rate  65  2017    [facility]



         

 

 Systolic (mm Hg)  179  2017    [facility] [place] [facility]



         Center



         

 

 Diastolic (mm Hg)  73  2017    [facility] [place]



         Center



         

 

 Height  172.72 cm  2017    [facility]



         

 

 Weight  93.182  2017    [facility]



         

 

 BMI Calculated  31.24  2017    [facility] [place]



         Center



         

 

 Respitory Rate  18  2017    [facility]



         

 

 Heart Rate  66  2017    [facility]



         

 

 Weight  92.9  2017    [facility]



         

 

 Height  173 cm  2017    [facility] [place]



         Center



         

 

 Systolic (mm Hg)  95  2017    [facility] [place]



         Center



         

 

 Diastolic (mm Hg)  58  2017    [facility]



         

 

 BMI Calculated  31.04  2017    [facility] [place]



         Center



         

 

 Height  172.72 cm  2016    [facility]



         

 

 BMI Calculated  31.39  2016    [facility]



         

 

 Weight  93.636  2016    [facility]



         

 

 Weight  93.892  2016    [facility]



         

 

 BMI Calculated  31.47  2016    [facility]



         

 

 Height  172.72 cm  2016    [facility]



         

 

 Temperature Oral (F)  97.2 F  2016    [facility]



         

 

 Respitory Rate  18  2016    [facility]



         

 

 Heart Rate  74  2016    [facility]



         

 

 Weight  95  2015    [facility]



         

 

 BMI Calculated  31.84  2015    [facility]



         

 

 Height  172.72 cm  2015    [facility]



         

 

 Weight  96.1  2015    [facility]



         

 

 BMI Calculated  31.38  2015    [facility] [place]



         Center



         

 

 Systolic (mm Hg)  123  2015    [facility] [place]



         Center



         

 

 Diastolic (mm Hg)  66  2015    [facility]



         

 

 Heart Rate  63  2015    [facility] [place]



         Center



         

 

 Respitory Rate  19  2015    [facility]



         

 

 Height  175 cm  2015    [facility]



         

 

 Temperature Oral (F)  98.1 F  2014    [facility] [place]



         Center



         

 

 Systolic (mm Hg)  150  2014    [facility] [place]



         Center



         

 

 Diastolic (mm Hg)  69  2014    [facility] [place]



         Center



         

 

 Respitory Rate  15  2014    [facility]



         

 

 Systolic (mm Hg)  186  2014    [facility] [place]



         Center



         

 

 Diastolic (mm Hg)  83  2014    [facility]



         

 

 Respitory Rate  12  2014    [facility]



         

 

 BMI Calculated  29.1  2014    [facility]



         

 

 Weight  86.818  2014    [facility]



         

 

 Height  172.72 cm  2014    [facility]



         

 

 Temperature Oral (F)  97.9 F  2014    [facility]



         

 

 Systolic (mm Hg)  194  2014    [facility]



         

 

 Respitory Rate  16  2014    [facility]



         

 

 Heart Rate  77  2014    [facility]



         

 

 Diastolic (mm Hg)  80  2014    [facility]



         

 

 BMI Calculated  29.71  2014    [facility]



         

 

 Weight  87.9  2014    [facility]



         

 

 Respitory Rate  20  2014    [facility]



         

 

 Systolic (mm Hg)  99  2014    [facility]



         

 

 Heart Rate  73  2014    [facility]



         

 

 Diastolic (mm Hg)  53  2014    [facility]



         

 

 Temperature Oral (F)  97.0 F  2014    [facility]



         

 

 Height  172 cm  2014    [facility]



         







Encounters







 Location  Location  Encounter  Encounter  Reason  Attending  ADM  DC  Status  
Source



   Details  Type  Number  For  Provider  Date  Date    



         Visit          



                   



                   



                   

 

 [facility]    TB  [id number]  PRE  NHI      Active  [facility] [place]      0  TRANSPLA  ADROGUE        [facility]        NT EVAL          Center



                   



                   



                   

 

 [facility]    Outpatient  [id number]  KIDNEY  HILDA    Active  [facility]



 Medical      0  [place]/CARDI  LOYALKA  /2013    [facility]        AC          Center



         CLEARENC          



         E PER          



         JOSESITO          



         GRAVES          



                   



                   



                   

 

 [facility] [place]    RADHA  [id number]  BDDC-REC  ATILLA    Active  [facility]



 Medical      3  TALON  ERTAN  /2013    [facility]        CANCER          Center



         SCREENIN          



         G          



                   



                   

 

 [facility]    Outpatient  [id number]  LAB  ANNAMARIA CURTIS    Active  [facility]



 Medical          [facility]    Outpatient  [id number]  LAB  ANNAMARIA CURTIS    Active  [facility]



 Medical      3      /2013  /2013    [facility]    Outpatient  [id number]  LAB  ANNAMARIA CURTIS  10/02    Active  [facility]



 Medical            [facility]    Outpatient  [id number]  LAB  ANNAMARIA CURTIS      Active  [facility]



 Medical            [facility]    Outpatient  [id number]  LAB  ANNAMARIA CURTIS      Active  [facility] [place]



 Medical            [facility]    Outpatient  [id number]  LAB  ANNAMARIA CURTIS      Active  [facility] [place]



 Medical            [facility]    OP  [id number]    Nhi  03/11  04/10     [place]



 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



 Hospital    Clinic -              Center



     Pre              



                   



                   

 

 [facility]    EC  [id number]    Cassidy       [place]



 [place]    Emergency  5    Kal  /2014    [facility]    OP  [phone]    Nhi       [place]



 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



     Pre              



                   



                   

 

 [facility]    OP  [id number]    Nhi      [facility]



 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



 [place]    Transplant  4    Adrogue  /2014    [facility]    Clinic -              Center



     Pre              



                   



                   

 

 [facility]    OP  [phone]    Non      [facility]



 [place]    Transplant  5    Physician  /2015    Medical



 Transplant    Clinic -              Center



 Ctr    Pre              



                   



                   

 

 [facility]    OP  [phone]    Lcair       [place]



 [place]    Transplant  6        [facility]    Clinic -              Center



     Pre              



                   



                   

 

 [facility]    Outpatient  [id number]    Barrington      [facility]



 [place]      8    Torres      [facility]



 Advanced                  



 Heart                  



 Failure                  



                   



                   

 

 [facility]    Outpatient  [id number]    Barrington  01/14  01/15    [facility]



 [place]      1    Torres      [facility]    OP  [phone]    Giulia       [place]



 [place]    Transplant  7    De Vivek      Medical



 Transplant    Clinic -              Center



 Ctr    Pre              



                   



                   

 

 [facility]    Outpt Diag  [id number]    Clair  02/14  02/15     [facility]



 Outpatient    Services  0        [place]



 Imaging                  



 [place]                  



                   



                   

 

 [facility]    Outpatient  [id number]    Soren Ruizmi  02/14  02/15     [place]



 [place]      3      /2017  /2017    [facility]    Bedded  [id number]    Soren Cordell       [place]



 [place]    Outpatient      [facility]    Recurring  [id number]    Clair      [facility]



 [place]      8        [facility]



 Advanced                  



 Heart                  



 Failure                  



                   



                   

 

 [facility]    Preadmit  [id number]  RENAL/DO  ANNAMARIA CURTIS      Active  [facility]      9  NOT USE          [facility]        THIS          Center



         ACCT FOR          



         CHARGES          



         F/C          



         NOTES          



         ONLY          



                   



                   



                   

 

 [facility]    Outpatient  [id number]  COLONSCO  ANNAMARIA CURTIS      Active  [facility]      1  [facility]



         E          



                   



                   







Procedures







 Procedure  Code  Date  Perfomer  Comments  Source



           



           

 

 Colonoscopy<sup>1</rodrigues  11515760  2013    Repeat in 3  [facility]



 p>        years  [facility]



           

 

 Colonoscopy<sup>1</rodrigues  [id number]  2013    Repeat in 3  [facility]



 p>        years  [place]



           



           

 

 Cardiac  [id number]  2013      [facility]



           

 

 Cardiac  [id number]  2013      [facility]



 catheterization          [place]



           



           

 

 Hemodialysis  [id number]  2012      [facility]



           

 

 Hemodialysis  [id number]  2012       [facility]



           [place]



           



           

 

 Chronic peritoneal  [id number]        [facility]



           

 

 Creation of  [id number]        [facility]



 arteriovenous shunt          Medical



 or fistula for          Center



 dialysis by external          



 cannula          



           

 

 Chronic peritoneal  [id number]        [facility]



 dialysis          [place]



           



           

 

 Creation of  [id number]        [facility]



 arteriovenous shunt          [place]



 or fistula for          



 dialysis by external          



 cannula

## 2019-06-21 NOTE — XMS REPORT
Patient Summary Document

 Created on:2019



Patient:HOMER SOARES

Sex:Male

:1955

External Reference #:[id number]





Demographics







 Address  [address] [place] [address]

 

 Home Phone  [phone]

 

 Preferred Language  Unknown

 

 Marital Status  Unknown

 

 [employer] Affiliation  Unknown

 

 Race  Unknown

 

 Additional Race(s)  Unavailable

 

 Ethnic Group  Unknown









Author







 Organization  [facility]

 

 Address  [address] [place] [address] Dr. Sotomayor [address] [place] [address]

 

 Phone  [phone]









Care Team Providers







 Name  Role  Phone

 

 Unavailable  Unavailable  Unavailable









Problems

This patient has no known problems.



Allergies, Adverse Reactions, Alerts

This patient has no known allergies or adverse reactions.



Medications

This patient has no known medications.

## 2019-08-20 ENCOUNTER — HOSPITAL ENCOUNTER (INPATIENT)
Dept: HOSPITAL 97 - ER | Age: 64
LOS: 3 days | Discharge: HOME | DRG: 380 | End: 2019-08-23
Attending: HOSPITALIST | Admitting: HOSPITALIST
Payer: COMMERCIAL

## 2019-08-20 VITALS — BODY MASS INDEX: 27.8 KG/M2

## 2019-08-20 DIAGNOSIS — I12.0: ICD-10-CM

## 2019-08-20 DIAGNOSIS — K26.4: ICD-10-CM

## 2019-08-20 DIAGNOSIS — D50.0: ICD-10-CM

## 2019-08-20 DIAGNOSIS — N18.6: ICD-10-CM

## 2019-08-20 DIAGNOSIS — K29.50: ICD-10-CM

## 2019-08-20 DIAGNOSIS — E11.40: ICD-10-CM

## 2019-08-20 DIAGNOSIS — I72.8: ICD-10-CM

## 2019-08-20 DIAGNOSIS — E11.22: ICD-10-CM

## 2019-08-20 DIAGNOSIS — E78.5: ICD-10-CM

## 2019-08-20 DIAGNOSIS — K22.11: Primary | ICD-10-CM

## 2019-08-20 DIAGNOSIS — K21.9: ICD-10-CM

## 2019-08-20 DIAGNOSIS — K20.9: ICD-10-CM

## 2019-08-20 DIAGNOSIS — I73.9: ICD-10-CM

## 2019-08-20 LAB
ALBUMIN SERPL BCP-MCNC: 2.7 G/DL (ref 3.4–5)
ALP SERPL-CCNC: 412 U/L (ref 45–117)
ALT SERPL W P-5'-P-CCNC: 31 U/L (ref 12–78)
ANISOCYTOSIS BLD QL: (no result)
AST SERPL W P-5'-P-CCNC: 35 U/L (ref 15–37)
BASO STIPL BLD QL SMEAR: (no result)
BLD SMEAR INTERP: (no result)
BUN BLD-MCNC: 136 MG/DL (ref 7–18)
GLUCOSE SERPLBLD-MCNC: 173 MG/DL (ref 74–106)
HCT VFR BLD CALC: 12.8 % (ref 39.6–49)
LYMPHOCYTES # SPEC AUTO: 1.9 K/UL (ref 0.7–4.9)
MACROCYTES BLD QL: (no result)
MORPHOLOGY BLD-IMP: (no result)
PMV BLD: 8.4 FL (ref 7.6–11.3)
POLYCHROMASIA BLD QL SMEAR: (no result)
POTASSIUM SERPL-SCNC: 4.3 MMOL/L (ref 3.5–5.1)
RBC # BLD: 1.15 M/UL (ref 4.33–5.43)

## 2019-08-20 PROCEDURE — 84484 ASSAY OF TROPONIN QUANT: CPT

## 2019-08-20 PROCEDURE — 93926 LOWER EXTREMITY STUDY: CPT

## 2019-08-20 PROCEDURE — 80053 COMPREHEN METABOLIC PANEL: CPT

## 2019-08-20 PROCEDURE — 93005 ELECTROCARDIOGRAM TRACING: CPT

## 2019-08-20 PROCEDURE — 36415 COLL VENOUS BLD VENIPUNCTURE: CPT

## 2019-08-20 PROCEDURE — 36430 TRANSFUSION BLD/BLD COMPNT: CPT

## 2019-08-20 PROCEDURE — 82728 ASSAY OF FERRITIN: CPT

## 2019-08-20 PROCEDURE — 97110 THERAPEUTIC EXERCISES: CPT

## 2019-08-20 PROCEDURE — 86901 BLOOD TYPING SEROLOGIC RH(D): CPT

## 2019-08-20 PROCEDURE — 85610 PROTHROMBIN TIME: CPT

## 2019-08-20 PROCEDURE — 97530 THERAPEUTIC ACTIVITIES: CPT

## 2019-08-20 PROCEDURE — 97161 PT EVAL LOW COMPLEX 20 MIN: CPT

## 2019-08-20 PROCEDURE — 90935 HEMODIALYSIS ONE EVALUATION: CPT

## 2019-08-20 PROCEDURE — 71045 X-RAY EXAM CHEST 1 VIEW: CPT

## 2019-08-20 PROCEDURE — 87070 CULTURE OTHR SPECIMN AEROBIC: CPT

## 2019-08-20 PROCEDURE — 87205 SMEAR GRAM STAIN: CPT

## 2019-08-20 PROCEDURE — 83540 ASSAY OF IRON: CPT

## 2019-08-20 PROCEDURE — 88305 TISSUE EXAM BY PATHOLOGIST: CPT

## 2019-08-20 PROCEDURE — 80069 RENAL FUNCTION PANEL: CPT

## 2019-08-20 PROCEDURE — 85018 HEMOGLOBIN: CPT

## 2019-08-20 PROCEDURE — 86850 RBC ANTIBODY SCREEN: CPT

## 2019-08-20 PROCEDURE — 82962 GLUCOSE BLOOD TEST: CPT

## 2019-08-20 PROCEDURE — 96365 THER/PROPH/DIAG IV INF INIT: CPT

## 2019-08-20 PROCEDURE — 88312 SPECIAL STAINS GROUP 1: CPT

## 2019-08-20 PROCEDURE — 85014 HEMATOCRIT: CPT

## 2019-08-20 PROCEDURE — 85025 COMPLETE CBC W/AUTO DIFF WBC: CPT

## 2019-08-20 PROCEDURE — 97116 GAIT TRAINING THERAPY: CPT

## 2019-08-20 PROCEDURE — 99285 EMERGENCY DEPT VISIT HI MDM: CPT

## 2019-08-20 PROCEDURE — 86900 BLOOD TYPING SEROLOGIC ABO: CPT

## 2019-08-20 PROCEDURE — 84466 ASSAY OF TRANSFERRIN: CPT

## 2019-08-20 PROCEDURE — 96366 THER/PROPH/DIAG IV INF ADDON: CPT

## 2019-08-20 PROCEDURE — 82607 VITAMIN B-12: CPT

## 2019-08-20 PROCEDURE — 96375 TX/PRO/DX INJ NEW DRUG ADDON: CPT

## 2019-08-20 PROCEDURE — 93971 EXTREMITY STUDY: CPT

## 2019-08-20 NOTE — XMS REPORT
Encounter CCD: 2013 to 2013

 Created on:2057



Patient:HOMER SOARES

Sex:Male

:1955

External Reference #:[id number]





Demographics







 Address  [address]



   [place] [address]-

 

 Home Phone  [phone]

 

 Preferred Language  Unknown

 

 Marital Status  

 

 Roman [employer] Affiliation  [place]os[place]

 

 Race  Other

 

 Ethnic Group  









Author







 Organization  [facility]









Care Team Providers







 Name  Role  Phone

 

 Linda West  Referring Provider  [phone]









Allergies, Adverse Reactions, Alerts







 Substance  Reaction  Status

 

 NKDA    Active







Problem List







 Condition  Effective Dates  Status

 

 Diabetes    Resolved

 

 Hypertension    Resolved

 

 renal failure    Resolved







Results

BEDSIDE GLUCOSE TESTING





 Most recent to oldest [Reference Range]:  1

 

 Gluc POC Lifscn [70-99 mg/dL]  193 mg/dL 1



   *HI*



   (2013 08:40:00)

 

 Comment1  Notify RN/MD



   *NA*



   (2013 08:40:00)



1Interpretive Data:  Upper Reportable Limit: 200 mg/dL.CHEMISTRY





 Most recent to oldest [Reference Range]:  1

 

 POC Potassium [3.5-5.1 mEq/L]  5.4 mEq/L



   *HI*



   (2013 08:41:00)

## 2019-08-20 NOTE — XMS REPORT
Clinical Summary

 Created on:2019



Patient:Kyle Soares

Sex:Male

:1955

External Reference #:[id number]





Demographics







 Address  [address]



   [place] [address]

 

 Home Phone  [phone]

 

 Work Phone  [phone]

 

 Mobile Phone  [phone]

 

 Email Address  [email or web address]

 

 Preferred Language  English

 

 Marital Status  

 

 Hinduism Affiliation  Unknown

 

 Race  White

 

 Ethnic Group   or 









Author







 Organization  [facility]

 

 Address  [address] [place] [address]









Support







 Name  Relationship  Address  Phone

 

 Sushma Soares  Spouse  [address]  [phone]



     [place] [address]  

 

 Alousia Cantu  Child  Unavailable  [phone]









Care Team Providers







 Name  Role  Phone

 

 Agatha Baugh MD  Primary Care Provider  [phone]









Allergies

No Known Allergies



Medications







 Medication  Sig  Dispensed  Refills  Start Date  End Date  Status

 

 calcium acetate  Take 1,334 mg    0      Active



 (PHOSLO) 667 mg  by mouth 3          



 capsule  (three) times a          



   day with meals.          

 

 labetalol  Take 600 mg by    0      Active



 (NORMODYNE) 200  mouth 2 (two)          



 MG tablet  times a day.          

 

 lovastatin  Take 20 mg by    0      Active



 (MEVACOR) 20 MG  mouth nightly.          



 tablet            

 

 brimonidine-josseline  Administer 1    0      Active



 lol (COMBIGAN)  drop to both          



 0.2-0.5 %  eyes 2 (two)          



 ophthalmic  times a day.          



 solution            

 

 aspirin  Take 81 mg by    0      Active



 (ECOTRIN) 81 MG  mouth every          



 enteric coated  morning.          



 tablet            

 

 losartan  Take 100 mg by    0      Active



 (COZAAR) 100 MG  mouth daily.          



 tablet            

 

 latanoprost  Administer 1    0      Active



 (XALATAN) 0.005  drop to both          



 % ophthalmic  eyes nightly.          



 solution            

 

 vit B comp  Take 1 tablet    0      Active



 C/folic acid/vit  by mouth daily.          



 D3 (DIALYVITE            



 800 PLUS D ORAL)            

 

 clopidogrel  Take 75 mg by    0      Active



 (PLAVIX) 75 mg  mouth daily.          



 tablet            

 

 NIFEdipine XL  Take 60 mg by    0      Active



 (PROCARDIA XL)  mouth daily.          



 60 MG 24 hr            



 tablet            

 

 calcium  Chew 2 tablets    0      Active



 carbonate (TUMS)  every 2 (two)          



 200 mg calcium  hours as needed          



 (500 mg)  for heartburn.          



 chewable tablet            

 

 insulin GLARGINE  Inject 10 Units    0      Active



 (LANTUS) 100  under the skin          



 unit/mL  daily before          



 injection (vial)  breakfast.          

 

 famotidine  Take 40 mg by    0      Active



 (PEPCID) 40 MG  mouth nightly.          



 tablet            

 

 minoxidil  Take 2.5 mg by    0      Active



 (LONITEN) 2.5 MG  mouth daily.          



 tablet            

 

 gabapentin  Take 1 capsule  90 capsule  0  2019  Active



 (NEURONTIN) 100  (100 mg total)        9  



 mg capsule  by mouth 3          



   (three) times a          



   day for 30          



   days.          

 

 valsartan  Take 320 mg by    0      Discontinued



 (DIOVAN) 320 MG  mouth every        9  



 tablet  evening.          

 

 acetaminophen-co  Take 1 tablet    0      Discontinued



 deine (TYLENOL  by mouth every        9  



 #3) 300-30 mg  6 (six) hours          



 per tablet  as needed for          



   moderate pain.          

 

 insulin NPH  Inject 15 Units    0      Discontinued



 (HumuLIN-N) 100  under the skin        9  (Med List



 unit/mL  every morning.          Cleanup)



 injection            

 

 insulin NPH  Inject 10 Units    0      Discontinued



 (HumuLIN-N) 100  under the skin        9  



 unit/mL  every evening.          



 injection            

 

 nystatin  Apply topically  160 g  2  06/10/2019  07/10/201  



 (MYCOSTATIN)  2 (two) times a        9  



 100,000  day for 30          



 unit/gram cream  days.          

 

 doxycycline  Take 1 tablet  20 tablet  0  06/10/2019  06/20/201  



 (VIBRA-TABS) 100  (100 mg total)        9  



 MG tablet  by mouth 2          



   (two) times a          



   day for 10          



   days.          

 

 cefuroxime  Take 500 mg by    0      Discontinued



 (CEFTIN) 500 MG  mouth 2 (two)        9  (Stop Taking at



 tablet  times a day.          Discharge)







Active Problems







 Problem  Noted Date

 

 Pseudoaneurysm of right femoral artery  2019









 Overview: 







 Added automatically from request for surgery [id number]









 Cellulitis of right lower extremity  2019

 

 Osteomyelitis of foot  2016

 

 Peripheral vascular disease  2016

 

 Essential hypertension  2016

 

 Glaucoma  2016

 

 ESRD (end stage renal disease)  2016

 

 Gangrene of toe  2016







Encounters







 Date  Type  Specialty  Care Team  Description

 

 2019  Office Visit  Cardiovascular  Miranda rEnandez MD  Peripheral vascular 
disease (HCC) (Primary Dx);



         Pseudoaneurysm of right femoral artery (HCC)

 

 2019  Orders Only  Cardiovascular  Haroon,  Pseudoaneurysm of



       GARCÍA Helton  right femoral artery



         ([facility]) (Primary Dx)

 

 2019  Patient Outreach  Quality  Willa oFrde RN  

 

 2019  Anesthesia Event  Cardiothoracic  Jeanie Dueñas MD Robles Garcia, Elsa, NP  

 

 2019  Surgery  Cardiothoracic  Miranda Ernandez MD  OPEN REPAIR OF RIGHT



     Surgery    FEMORAL



         PSEUDOANEURYSM,



         PRIMARY REPAIR OF SFA

 

 2019 -  Hospital Encounter  General Internal  Miranda Ernandez MD



  Pseudoaneurysm of



 2019    Medicine  Remy Berger  right femoral artery



       MD PATTI  ([facility])

 

 2019  Pre-Admit Testing  Pre-Admission Testing  Miranda Ernandez MD  
Preoperative testing



   Appointment      (Primary Dx)

 

 2019  Hospital Encounter  Radiology  Miranda Ernandez MD  Pseudoaneurysm of



         right femoral artery



         ([facility])

 

 2019  Pre-Admit Testing  Pre-Admission Testing  Miranda Ernandez MD  
Pseudoaneurysm of



   Appointment      right femoral artery



         ([facility])

 

 2019  Telephone  Cardiovascular  Sunitha Patiño RN  

 

 2019  Telephone  Cardiovascular  Sunitha Patiño RN  

 

 2019  Telephone  Cardiovascular  Sunitha Patiño RN  

 

 2019  Telephone  Cardiovascular  Sunitha Patiño RN  

 

 2019  Office Visit  Cardiovascular  Miranda Ernandez MD  Pseudoaneurysm of



         right femoral artery



         (HCC) (Primary Dx)

 

 2019  Prep for Surgery  Cardiovascular  Haroon,  Pseudoaneurysm of



       GARCÍA Helton  right femoral artery



         (HCC) (Primary Dx)

 

 2019  Prep for Surgery  Cardiovascular  Sunitha Patiño RN  

 

 2019 -  Hospital Encounter  Orthopedic Surgery  Sean Perkins  Cellulitis 
of right lower extremity (Primary Dx);



 06/10/2019      MD Shad



  Pseudoaneurysm of femoral artery ([facility]);



       Theodore Moya  ESRD (end stage renal disease) on dialysis ([facility]);



       MD MICHEAL



  Right leg swelling;



       Jono,  Hypertensive urgency;



       Farrukh ROBLES  Stage 4 chronic kidney disease ([facility]);



       MD Delonte  Peripheral vascular disease ([facility]);



         Essential hypertension;



         ESRD (end stage renal disease) ([facility])

 

 2019  Telephone  Cardiovascular  Sunitha Patiño RN  

 

 2019  Orders Only  Cardiovascular  Rossana,  Pseudoaneurysm (HCC)



       GARCÍA Siegel  (Primary Dx)



after 2018



Family History







 Medical History  Relation  Name  Comments

 

 Diabetes  Father    

 

 Diabetes  Mother    









 Relation  Name  Status  Comments

 

 Father      

 

 Mother      







Social History







 Tobacco Use  Types  Packs/Day  Years Used  Date

 

 Former Smoker  Cigarettes  0.25    Quit: 









 Smokeless Tobacco: Never Used      









 Comments: 20









 Alcohol Use  Drinks/Week  oz/Week  Comments

 

 Not Currently      QUIT 









 Sex Assigned at Birth  Date Recorded

 

 Not on file  









 Job Start Date  Occupation  Industry

 

 Not on file  Not on file  Not on file









 Travel History  Travel Start  Travel End









 No recent travel history available.







Last Filed Vital Signs







 Vital Sign  Reading  Time Taken  Comments

 

 Blood Pressure  175/65  2019  8:44 AM CDT  

 

 Pulse  53  2019  8:44 AM CDT  

 

 Temperature  36.8 C (98.3 F)  2019  8:44 AM CDT  

 

 Respiratory Rate  22  2019 11:44 AM CDT  

 

 Oxygen Saturation  96%  2019 11:44 AM CDT  

 

 Inhaled Oxygen Concentration  -  -  

 

 Weight  85.8 kg (189 lb 1.6 oz)  2019  8:44 AM CDT  

 

 Height  172.7 cm (5' 8")  2019  8:44 AM CDT  

 

 Body Mass Index  28.75  2019  8:44 AM CDT  







Plan of Treatment







 Date  Type  Specialty  Care Team  Description

 

 2019  Appointment  Procedural Cardiology  Miranda Ernandez MD



  



       [address] [facility]



  



       [address] [place] [id number]



  



       [phone] (Fax)  

 

 2019  Office Visit  Cardiovascular  Miranda Ernandez MD



  



       [address] [facility]



  



       [address] [place] [address] [id number] [phone] (Fax)  









 Health Maintenance  Due Date  Last Done  Comments

 

 COLONOSCOPY SCREENING  2007    

 

 SHINGLES VACCINES (#1)  2007    

 

 INFLUENZA VACCINE  2019    







Implants







 Implanted  Type  Area    Device  Shelf  Model /



         Identifier  Expiration  Serial /



           Date  Lot

 

 Clip Maneng Dane Hemoclip Plus W/ Tape Ti Med - [id number]  Medical  N/A: N/A  
[employer]LE[employer]ICAL      [id number] /



 Implanted: 2019 at [facility] (Quantity not on file)  Clips for       
    /



   Internal Use          

 

 Clip Ligtng Weck Hemoclip Plus W/ Tape Ti Med - [id number]  Medical  N/A: N/A  
TELE[employer]LEX MEDICAL      [id number] /



 Implanted: 2019 at [facility] (Quantity not on file)  Clips for       
    /



   Internal Use          

 

 Clip Ligtng Weck Hemoclip Plus W/ Tape Ti Sm Strngpnt - [id number]  Medical  N/
A: N/A  WECK CLOSURE      [id number] /



 Implanted: 2019 at [facility] (Quantity not on file)  Clips for    
SYSTEMS       /



   Internal Use          

 

 Clip Ligtng Weck Hemoclip Plus W/ Tape Ti Sm Strngpnt - [id number]  Medical  N/
A: N/A  WECK CLOSURE      [id number] /



 Implanted: 2019 at [facility] (Quantity not on file)  Clips for    
SYSTEMS       /



   Internal Use          

 

 Clip Ligtng Weck Hemoclip Plus W/ Tape Ti Sm Strngpnt - [id number]  Medical  N/
A: N/A  WECK CLOSURE      [id number] /



 Implanted: 2019 at [facility] (Quantity not on file)  Clips for    
SYSTEMS       /



   Internal Use          

 

 Clip Ligtng Weck Hemoclip Plus W/ Tape Ti Med - [id number]  Medical  N/A: N/A  
TELE[employer]LEX MEDICAL      [id number] /



 Implanted: 2019 at [facility] (Quantity not on file)  Clips for       
    /



   Internal Use          

 

 Felt Perph Vasclr Ptfe 1.2x10cm 1.65mm - [id number]  Vascular  N/A: N/A  BARD 
PERIPHERAL    2024  [id number] /



 Implanted: 2019 at [facility] (Quantity not on file)  Graft    
VASCULAR       /



             [id number]







Procedures







 Procedure Name  Priority  Date/Time  Associated  Comments



       Diagnosis  

 

 POC GLUCOSE  Routine  2019 11:47    Results for this



     AM CDT    procedure are in



         the results



         section.

 

 HEMODIALYSIS  Routine  2019 11:03    



     AM CDT    

 

 POC GLUCOSE  Routine  2019  8:47    Results for this



     AM CDT    procedure are in



         the results



         section.

 

 HC COMPLETE BLD COUNT  Routine  2019  3:45    Results for this



 W/AUTO DIFF    AM CDT    procedure are in



         the results



         section.

 

 ESTIMATED GFR  Routine  2019  3:45    Results for this



     AM CDT    procedure are in



         the results



         section.

 

 BASIC METABOLIC PANEL  Routine  2019  3:45    Results for this



     AM CDT    procedure are in



         the results



         section.

 

 POC GLUCOSE  Routine  2019  9:19    Results for this



     PM CDT    procedure are in



         the results



         section.

 

 CT ABDOMEN PELVIS W  Routine  2019  8:23    Results for this



 CONTRAST    PM CDT    procedure are in



         the results



         section.

 

 POC GLUCOSE  Routine  2019  5:20    Results for this



     PM CDT    procedure are in



         the results



         section.

 

 POC GLUCOSE  Routine  2019 12:12    Results for this



     PM CDT    procedure are in



         the results



         section.

 

 POC GLUCOSE  Routine  2019  7:08    Results for this



     AM CDT    procedure are in



         the results



         section.

 

 HC COMPLETE BLD COUNT  Routine  2019  5:25    Results for this



 W/AUTO DIFF    AM CDT    procedure are in



         the results



         section.

 

 ESTIMATED GFR  Routine  2019  4:00    Results for this



     AM CDT    procedure are in



         the results



         section.

 

 BASIC METABOLIC PANEL  Routine  2019  4:00    Results for this



     AM CDT    procedure are in



         the results



         section.

 

 POC GLUCOSE  Routine  2019 10:51    Results for this



     PM CDT    procedure are in



         the results



         section.

 

 POC GLUCOSE  Routine  2019  7:42    Results for this



     PM CDT    procedure are in



         the results



         section.

 

 HEPATITIS B SURFACE  STAT  2019  5:29    Results for this



 ANTIGEN    PM CDT    procedure are in



         the results



         section.

 

 HEMODIALYSIS  Routine  2019  4:49    



     PM CDT    

 

 ACTIVATED CLOTTING  Routine  2019  4:07    Results for this



 TIME    PM CDT    procedure are in



         the results



         section.

 

 SURGICAL PATHOLOGY  Routine  2019  1:37    Results for this



 REQUEST    PM CDT    procedure are in



         the results



         section.

 

 ESTIMATED GFR  Routine  2019  1:04    Results for this



     PM CDT    procedure are in



         the results



         section.

 

 BILIRUBIN DIRECT  Routine  2019  1:04    Results for this



     PM CDT    procedure are in



         the results



         section.

 

 PROTHROMBIN TIME WITH  Routine  2019  1:04    Results for this



 INR    PM CDT    procedure are in



         the results



         section.

 

 B NATRIURETIC PEPTIDE  Routine  2019  1:04    Results for this



     PM CDT    procedure are in



         the results



         section.

 

 CREATINE KINASE, TOTAL  Routine  2019  1:04    Results for this



 (CPK)    PM CDT    procedure are in



         the results



         section.

 

 COMPREHENSIVE  Routine  2019  1:04    Results for this



 METABOLIC PANEL    PM CDT    procedure are in



         the results



         section.

 

 LACTIC ACID LEVEL  Routine  2019  1:04    Results for this



     PM CDT    procedure are in



         the results



         section.

 

 LIPASE LEVEL  Routine  2019  1:04    Results for this



     PM CDT    procedure are in



         the results



         section.

 

 LIPID PANEL  Routine  2019  1:04    Results for this



     PM CDT    procedure are in



         the results



         section.

 

 MAGNESIUM LEVEL  Routine  2019  1:04    Results for this



     PM CDT    procedure are in



         the results



         section.

 

 PHOSPHORUS LEVEL  Routine  2019  1:04    Results for this



     PM CDT    procedure are in



         the results



         section.

 

 HC COMPLETE BLD COUNT  Routine  2019  1:03    Results for this



 W/AUTO DIFF    PM CDT    procedure are in



         the results



         section.

 

 HEMOGLOBIN A1C  Routine  2019  1:03    Results for this



     PM CDT    procedure are in



         the results



         section.

 

 POC GLUCOSE  Routine  2019 12:08    Results for this



     PM CDT    procedure are in



         the results



         section.

 

 PREALBUMIN LEVEL  Routine  2019 12:03    Results for this



     PM CDT    procedure are in



         the results



         section.

 

 ACTIVATED CLOTTING  Routine  2019 11:09    Results for this



 TIME    AM CDT    procedure are in



         the results



         section.

 

 ACTIVATED CLOTTING  Routine  2019 10:24    Results for this



 TIME    AM CDT    procedure are in



         the results



         section.

 

 GLUCOSE LEVEL, SYRINGE  STAT  2019  8:55    Results for this



     AM CDT    procedure are in



         the results



         section.

 

 IONIZED CALCIUM,  STAT  2019  8:55    Results for this



 ARTERIAL    AM CDT    procedure are in



         the results



         section.

 

 POTASSIUM, SYRINGE  STAT  2019  8:55    Results for this



     AM CDT    procedure are in



         the results



         section.

 

 HEMOGLOBIN, SYRINGE  STAT  2019  8:55    Results for this



     AM CDT    procedure are in



         the results



         section.

 

 SODIUM LEVEL, SYRINGE  STAT  2019  8:55    Results for this



     AM CDT    procedure are in



         the results



         section.

 

 ARTERIAL BLOOD GAS,  STAT  2019  8:55    Results for this



 CORRECTED    AM CDT    procedure are in



         the results



         section.

 

 ACTIVATED CLOTTING  Routine  2019  8:50    Results for this



 TIME    AM CDT    procedure are in



         the results



         section.

 

 ARTERIAL LINE  Routine  2019  8:43    



     AM CDT    









   Procedure Note - Jeanie Dueñas MD - 2019  8:43 AM CDT



Arterial line



   Performed by: Jeanie Dueñas MD



   Authorized by: Jeanie Dueñas MD



   



   Patient Location:  Pre-op



   Staff:



     Anesthesiologist:  Jeanie Dueñas MD



     Resident/CRNA/AA:  Anabella Kaur CRNA



     Performed by:  Resident/CRNA and resident/CRNA/AA



   Pre-procedure: patient identified, IV checked, site and side verified, risks 
and benefits discussed, procedure verified, surgical consent complete, patient 
position confirmed, monitors and equipment checked and pre-op evaluation 
complete



   MSBT: antiseptic used, all elements of maximal sterile barrier technique 
followed, hand hygiene performed, cap/gown used by other personnel and 
solutions labeled



   Indications:



     Indications: hemodynamic monitoring



   Anesthesia:



     Anesthesia:  General



   Procedure Details:



     Arterial Line placement:  Placed post induction



     Line placement site:  Radial



   Line placement side:  Right



     Arterial line gauge:  20 G



   Number of attempts:  1



   Ultrasound guidance used: No



   Post-procedure:



     Post-procedure:  Sterile dressing applied



     Post procedure circulation, sensation, movement:  Normal



     Patient tolerance:  Patient tolerated the procedure well with no immediate 
complications









 MT AN ELECTIVE ENDOTRACHEAL AIRWAY  Routine  2019  8:42 AM CDT    









   Procedure Note - Jeanie Dueñas MD - 2019  8:42 AM CDT



Airway



   Performed by: Jeanie Dueñas MD



   Authorized by: Jeanie Dueñas MD



   



   Location:  OR



   Urgency:  Elective



   Difficult Airway: No



   Anesthesiologist:  Jeanie Dueñas MD



   Performed by:



      anesthesiologist



   Preoxygenated with 100% O2: Yes



   C-spine Precautions Maintained Throughout: Yes



   Mask Ventilation:  Easy mask



   Final Airway Type:  Endotracheal airway



   Final Endotracheal Airway:  ETT



   Cuffed: Yes



   Technique Used:  Video laryngoscopy



   Devices/Methods Used in Placement:  Intubating stylet



   Insertion Site:  Oral



   Blade Type:  Horton



   Laryngoscope Blade/Videolaryngoscope Blade Size:  2



   ETT Size (mm):  8.0



   Cuff at minimum occlusion pressure: Yes



   Measured from:  Lips



   Placement Verified by: CO2 detection, direct visualization and equal breath 
sounds



   Laryngoscopic view:  Grade IIb - view of arytenoids or posterior of glottis 
only



   Rapid Sequence Induction (RSI): No



   Modified RSI: No



   Number of Attempts at Approach:  1



    Anterior easy glide









 SODIUM LEVEL, SYRINGE  STAT  2019  6:00    Results for this



     AM CDT    procedure are in



         the results



         section.

 

 GLUCOSE LEVEL, SYRINGE  STAT  2019  6:00    Results for this



     AM CDT    procedure are in



         the results



         section.

 

 POTASSIUM, SYRINGE  STAT  2019  6:00    Results for this



     AM CDT    procedure are in



         the results



         section.

 

 HEMOGLOBIN, SYRINGE  STAT  2019  6:00    Results for this



     AM CDT    procedure are in



         the results



         section.

 

 PREPARE RBC  Routine  2019 11:39    Results for this



     AM CDT    procedure are in



         the results



         section.

 

 TYPE AND SCREEN  Routine  2019 11:39  Preoperative testing  Results for 
this



     AM CDT    procedure are in



         the results



         section.

 

 XR CHEST 2 VW  Routine  2019 12:44  Pseudoaneurysm of  Results for this



     PM CDT  right femoral artery  procedure are in



       (HCC)  the results



         section.

 

 ECG PRE/POST OP  Routine  2019 11:54  Pseudoaneurysm of  Results for this



     AM CDT  right femoral artery  procedure are in



       (HCC)  the results



         section.

 

 ESTIMATED GFR  Routine  2019 11:39    Results for this



     AM CDT    procedure are in



         the results



         section.

 

 HC COMPLETE BLD COUNT  Routine  2019 11:39  Pseudoaneurysm of  Results 
for this



 W/AUTO DIFF    AM CDT  right femoral artery  procedure are in



       (HCC)  the results



         section.

 

 BASIC METABOLIC PANEL  Routine  2019 11:39  Pseudoaneurysm of  Results 
for this



     AM CDT  right femoral artery  procedure are in



       (HCC)  the results



         section.

 

 POC GLUCOSE  Routine  06/10/2019  8:31    Results for this



     AM CDT    procedure are in



         the results



         section.

 

 ESTIMATED GFR  Routine  06/10/2019  7:10    Results for this



     AM CDT    procedure are in



         the results



         section.

 

 BASIC METABOLIC PANEL  Routine  06/10/2019  7:10    Results for this



     AM CDT    procedure are in



         the results



         section.

 

 ESTIMATED GFR  Routine  06/10/2019  4:00    Results for this



     AM CDT    procedure are in



         the results



         section.

 

 BASIC METABOLIC PANEL  Routine  06/10/2019  4:00    Results for this



     AM CDT    procedure are in



         the results



         section.

 

 VANCOMYCIN LEVEL,  Routine  06/10/2019  4:00    Results for this



 RANDOM    AM CDT    procedure are in



         the results



         section.

 

 HEMODIALYSIS  Routine  06/10/2019 12:05    



     AM CDT    

 

 POC GLUCOSE  Routine  2019 10:45    Results for this



     PM CDT    procedure are in



         the results



         section.

 

 POC GLUCOSE  Routine  2019  5:46    Results for this



     PM CDT    procedure are in



         the results



         section.

 

 POC GLUCOSE  Routine  2019  1:06    Results for this



     PM CDT    procedure are in



         the results



         section.

 

 POC GLUCOSE  Routine  2019  7:54    Results for this



     AM CDT    procedure are in



         the results



         section.

 

 POC GLUCOSE  Routine  2019  9:25    Results for this



     PM CDT    procedure are in



         the results



         section.

 

 POC GLUCOSE  Routine  2019  5:08    Results for this



     PM CDT    procedure are in



         the results



         section.

 

 CLOSTRIDIUM DIFFICILE  Routine  2019  1:16    Results for this



 TOXIN    PM CDT    procedure are in



         the results



         section.

 

 POC GLUCOSE  Routine  2019 11:50    Results for this



     AM CDT    procedure are in



         the results



         section.

 

 POC GLUCOSE  Routine  2019  7:33    Results for this



     AM CDT    procedure are in



         the results



         section.

 

 ESTIMATED GFR  Routine  2019  5:09    Results for this



     AM CDT    procedure are in



         the results



         section.

 

 BASIC METABOLIC PANEL  Routine  2019  5:09    Results for this



     AM CDT    procedure are in



         the results



         section.

 

 HC COMPLETE BLD COUNT  Routine  2019  5:09    Results for this



 W/AUTO DIFF    AM CDT    procedure are in



         the results



         section.

 

 POC GLUCOSE  Routine  2019  9:41    Results for this



     PM CDT    procedure are in



         the results



         section.

 

 POC GLUCOSE  Routine  2019  5:27    Results for this



     PM CDT    procedure are in



         the results



         section.

 

 POC GLUCOSE  Routine  2019 11:51    Results for this



     AM CDT    procedure are in



         the results



         section.

 

 HEMODIALYSIS  Routine  2019 10:26    



     AM CDT    

 

 POC GLUCOSE  Routine  2019  8:44    Results for this



     AM CDT    procedure are in



         the results



         section.

 

 HEPATITIS B SURFACE  STAT  2019  7:07    Results for this



 ANTIGEN    AM CDT    procedure are in



         the results



         section.

 

 HC COMPLETE BLD COUNT  Routine  2019  4:30    Results for this



 W/AUTO DIFF    AM CDT    procedure are in



         the results



         section.

 

 ESTIMATED GFR  Routine  2019  4:00    Results for this



     AM CDT    procedure are in



         the results



         section.

 

 BASIC METABOLIC PANEL  Routine  2019  4:00    Results for this



     AM CDT    procedure are in



         the results



         section.

 

 CT ANGIOGRAM ABDOMEN  Routine  2019  8:00    Results for this



 PELVIS W AND OR WO    PM CDT    procedure are in



 CONTRAST        the results



         section.

 

 POC GLUCOSE  Routine  2019  5:57    Results for this



     PM CDT    procedure are in



         the results



         section.

 

 POC GLUCOSE  Routine  2019 12:30    Results for this



     PM CDT    procedure are in



         the results



         section.

 

 POC GLUCOSE  Routine  2019  7:41    Results for this



     AM CDT    procedure are in



         the results



         section.

 

 LIPID PANEL  Routine  2019  4:00    Results for this



     AM CDT    procedure are in



         the results



         section.

 

 LACTIC ACID LEVEL  Routine  2019  4:00    Results for this



     AM CDT    procedure are in



         the results



         section.

 

 HEMOGLOBIN A1C  Routine  2019  4:00    Results for this



     AM CDT    procedure are in



         the results



         section.

 

 ESTIMATED GFR  Routine  2019  4:00    Results for this



     AM CDT    procedure are in



         the results



         section.

 

 BASIC METABOLIC PANEL  Routine  2019  4:00    Results for this



     AM CDT    procedure are in



         the results



         section.

 

 HC COMPLETE BLD COUNT  Routine  2019  4:00    Results for this



 W/AUTO DIFF    AM CDT    procedure are in



         the results



         section.

 

 BLOOD CULTURE, AEROBIC  Routine  2019  7:55    Results for this



 & ANAEROBIC    PM CDT    procedure are in



         the results



         section.

 

 TYPE AND SCREEN  Routine  2019  7:30    Results for this



     PM CDT    procedure are in



         the results



         section.

 

 LACTIC ACID LEVEL,  Timed  2019  7:30    Results for this



 SEPSIS - NOW AND    PM CDT    procedure are in



 REPEAT 2X EVERY 3        the results



 HOURS        section.

 

 BLOOD CULTURE, AEROBIC  Routine  2019  7:30    Results for this



 & ANAEROBIC    PM CDT    procedure are in



         the results



         section.

 

 US DUPLEX VENOUS LOWER  STAT  2019  6:05    Results for this



 EXTREMITY RIGHT    PM CDT    procedure are in



         the results



         section.

 

 ESTIMATED GFR  STAT  2019  4:34    Results for this



     PM CDT    procedure are in



         the results



         section.

 

 COMPREHENSIVE  STAT  2019  4:34    Results for this



 METABOLIC PANEL    PM CDT    procedure are in



         the results



         section.

 

 PARTIAL THROMBOPLASTIN  STAT  2019  4:34    Results for this



 TIME (PTT)    PM CDT    procedure are in



         the results



         section.

 

 PROTHROMBIN TIME WITH  STAT  2019  4:34    Results for this



 INR    PM CDT    procedure are in



         the results



         section.

 

 HC COMPLETE BLD COUNT  STAT  2019  4:34    Results for this



 W/AUTO DIFF    PM CDT    procedure are in



         the results



         section.

 

 US DUPLEX ARTERIAL  Routine  2019  9:02  Pseudoaneurysm (HCC)  Results 
for this



 LOWER EXTREMITY RIGHT    AM CDT    procedure are in



         the results



         section.



after 2018



Results

POC glucose (2019 11:47 AM CDT)Only the most recent of25 resultswithin 
the time period is included.





 Component  Value  Ref Range  Performed At  [facility]

 

 POC glucose  161 (H)  65 - 99 mg/dL  [facility]  



   Comment:    HOSPITAL  



   [facility] Notified RN      



   Meter ID: [id number]      



   : Christiano Neal      



         









 Specimen

 

 









 Performing Organization  Address  City/[facility]  Phone Number

 

 [facility] DEPARTMENT OF PATHOLOGY AND  [address] [place] [address]  



Estimated GFR (2019  3:45 AM CDT)Only the most recent of10 resultswithin 
the time period is included.





 Component  Value  Ref Range  Performed At  [facility]

 

 Estimated GFR  10 (A)  mL/min/1.73  [facility]  



   Comment:  [address]  



   CatergoryUnitsInterpretation      



   G1 >=90 Normal or high      



   G2 60-89Mildly decreased      



   A3d61-84Hkseey to moderately decreased      



   Y8n08-27Ztwwwamzxl to severely decreased      



   G4 15-29Severely decreased      



   G5 <15Kidney failure      



   The eGFR was calculated using the Chronic Kidney Disease      



   Epidemiology Collaboration (CKD-EPI) equation.      



   Interpretation is based on recommendations of the      



   National Kidney Foundation-Kidney Disease Outcomes Quality      



   Initiative (NKF-KDOQI) published in 2014.      



         









 Specimen

 

 Plasma specimen









 Performing Organization  Address  City/[facility]/Zipcode  Phone Number

 

 [facility] DEPARTMENT OF PATHOLOGY AND  [address], [place] [id number] [place] [address]  



CBC with platelet and differential (2019  3:45 AM CDT)Only the most 
recent of8 resultswithin the time period is included.





 Component  Value  Ref Range  Performed At  [facility]

 

 WBC  9.02  4.50 - 11.00  [facility]  



     k/uL  [facility]  

 

 RBC  2.66 (L)  4.40 - 6.00  [facility]  



     m/uL  [facility]  

 

 HGB  9.4 (L)  14.0 - 18.0  [facility]  



     g/dL  HOSPITAL  

 

 HCT  30.1 (L)  41.0 - 51.0 %  [facility]  

 

 MCV  113.2 (H)  82.0 - 100.0  [facility]  

 

 MCH  35.3 (H)  27.0 - 34.0 pg  [facility]  

 

 MCHC  31.2  31.0 - 37.0  [facility]  



     g/dL  [facility]  

 

 RDW - SD  65.9 (H)  37.0 - 55.0 fL  [facility]  

 

 MPV  10.9  8.8 - 13.2 fL  [facility]  

 

 Platelet count  100 (L)  150 - 400 k/uL  [facility]  

 

 Nucleated RBC  0.00  /100 WBC  [facility]  

 

 Neutrophils  72.2 (H)  39.0 - 69.0 %  [facility]  

 

 Lymphocytes  14.5 (L)  25.0 - 45.0 %  [facility]  

 

 Monocytes  10.2 (H)  0.0 - 10.0 %  [facility]  

 

 Eosinophils  2.3  0.0 - 5.0 %  [facility]  

 

 Basophils  0.4  0.0 - 1.0 %  [facility]  

 

 Immature granulocytes  0.4Comment:  0.0 - 1.0 %  [facility]  



   "[facility]  



   granulocytes"      



   (promyelocytes      



   , myelocytes,      



   metamyelocytes      



   )      









 Specimen

 

 









 Performing Organization  Address  City/[facility]  Phone Number

 

 [facility] DEPARTMENT OF PATHOLOGY AND  [address] [place] [address]  



 GENOMIC MEDICINE      

 

 [address] [address]  



Basic metabolic panel (2019  3:45 AM CDT)Only the most recent of8 
resultswithin the time period is included.





 Component  Value  Ref Range  Performed At  Pathologist Signature

 

 Sodium  137  135 - 148 mEq/L  [facility]  

 

 Potassium  4.2  3.5 - 5.0 mEq/L  [facility]  

 

 Chloride  97 (L)  98 - 112 mEq/L  [facility]  

 

 CO2  26  24 - 31 mEq/L  [facility]  

 

 Anion gap  14@ANIO  7 - 15 mEq/L  [facility]  

 

 BUN  38 (H)  8 - 23 mg/dL  [facility]  

 

 Creatinine  5.84 (H)  0.70 - 1.20 mg/dL  [facility]  

 

 Glucose  97  65 - 99 mg/dL  [facility]  

 

 Calcium  9.1  8.8 - 10.2 mg/dL  [facility]  









 Specimen

 

 Plasma specimen









 Performing Organization  Address  City/[facility]  Phone Number

 

 [facility] DEPARTMENT OF PATHOLOGY AND  [address] [place] [address]  



 GENOMIC MEDICINE      

 

 [facility]  [address] [place] [address]  



CT Abdomen Pelvis W Contrast (2019  8:23 PM CDT)





 Specimen

 

 









 Narrative  Performed At

 

 EXAMINATION:CT ABDOMEN PELVIS W CONTRAST



   [facility]



  



  



 CLINICAL HISTORY:abscess



  



  



  



 TECHNIQUE: Multiple axial images of the abdomen and pelvis were obtained  



 following intravenous administration of iodinated contrast. Sagittal and  



 coronal computerized reformatted images were also obtained.Automatic  



 exposure control and iterative 



  



 reconstruction techniques used to reduce dose.



  



  



  



 COMPARISON:2019



  



  



  



 FINDINGS:



  



  



  



  lung bases are clear



  



  



  



 The liver appears enlarged with a vague nodularity present. Cirrhosis  



 cannot entirely be excluded and clinical correlation is recommended. The  



 spleen is at the upper limits of normal in size measuring 12.7 cm in the  



 craniocaudal diameter



  



  



  



 Moderate ascites in the upper abdomen



  



  



  



 The gallbladder, adrenals and kidneys are within normal limits



  



  



  



 Small bowel and colon are within normal limits. There is no evidence of  



 appendicitis



  



  



  



 No evidence of abscess within the abdomen or pelvis



  



  



  



 Pelvis:



  



  



  



 An 11 x 5 x 14 cm loculated fluid collection is present within the  



 subcutaneous fat of the left buttock. Finding is likely secondary to a  



 large hematoma. No air is present within this collection to suggest  



 abscess.



  



  



  



 Age related changes are present throughout the bony structures without  



 evidence of a suspicious focal lesion.



  



  



  



 Lung bases are clear



  



  



  



 Mild calcified atherosclerotic vascular disease throughout the arterial  



 structures.



  



  



  



  



  



  



  



 IMPRESSION:



  



  



  



 An 11 x 5 x 14 cm loculated fluid collection is present within the  



 subcutaneous fat of the left buttock. Finding is likely secondary to a  



 large hematoma. No air is present within this collection to suggest  



 abscess.



  



  



  



 The liver and spleen are enlarged. There is vague nodularity of the  



 liver. Cirrhosis cannot be excluded



  



  



  



 Moderate amount of ascites throughout the abdomen and pelvis



  



 [facility]-[id number]  









 Procedure Note

 

  Interface, Radiology Results Incoming - 2019  8:36 PM CDT



EXAMINATION:  CT ABDOMEN PELVIS W CONTRAST



 



 CLINICAL HISTORY:  abscess



 



 TECHNIQUE: Multiple axial images of the abdomen and pelvis were obtained 
following intravenous administration of iodinated contrast. Sagittal and 
coronal computerized reformatted images were also obtained.Automatic exposure 
control and iterative



 reconstruction techniques used to reduce dose.



 



 COMPARISON:  2019



 



 FINDINGS:



 



  lung bases are clear



 



 The liver appears enlarged with a vague nodularity present. Cirrhosis cannot 
entirely be excluded and clinical correlation is recommended. The spleen is at 
the upper limits of normal in size measuring 12.7 cm in the craniocaudal 
diameter



 



 Moderate ascites in the upper abdomen



 



 The gallbladder, adrenals and kidneys are within normal limits



 



 Small bowel and colon are within normal limits. There is no evidence of 
appendicitis



 



 No evidence of abscess within the abdomen or pelvis



 



 Pelvis:



 



 An 11 x 5 x 14 cm loculated fluid collection is present within the 
subcutaneous fat of the left buttock. Finding is likely secondary to a large 
hematoma. No air is present within this collection to suggest abscess.



 



 Age related changes are present throughout the bony structures without 
evidence of a suspicious focal lesion.



 



 Lung bases are clear



 



 Mild calcified atherosclerotic vascular disease throughout the arterial 
structures.



 



 



 



 IMPRESSION:



 



 An 11 x 5 x 14 cm loculated fluid collection is present within the 
subcutaneous fat of the left buttock. Finding is likely secondary to a large 
hematoma. No air is present within this collection to suggest abscess.



 



 The liver and spleen are enlarged. There is vague nodularity of the liver. 
Cirrhosis cannot be excluded



 



 Moderate amount of ascites throughout the abdomen and pelvis



 [facility]-[id number]









 Performing Organization  Address  City/[facility]/Zipcode  Phone Number

 

 [place] [address]  



Hepatitis B surface antigen (2019  5:29 PM CDT)Only the most recent of2 
resultswithin the time period is included.





 Component  Value  Ref Range  Performed At  Pathologist Signature

 

 Hepatitis B surface  Non-reactive  Non-reactive  [facility]  









 Specimen

 

 Blood









 Performing Organization  Address  City/[facility]  Phone Number

 

 [facility] DEPARTMENT OF PATHOLOGY AND  [address] [place] [address]  



Activated clotting time (2019  4:07 PM CDT)Only the most recent of4 
resultswithin the time period is included.





 Component  Value  Ref Range  Performed At  Pathologist



         Signature

 

 Activated clotting  208 (H)  96 - 152 sec  [facility]  Comment:    HOSPITAL  



   Meter ID: [id number]      



   : Ishmael Tena      



         









 Specimen

 

 









 Performing Organization  Address  City/[facility]/Zipcode  Phone Number

 

 [facility] DEPARTMENT OF PATHOLOGY AND  [address] [place] [address]  



Surgical pathology request (2019  1:37 PM CDT)





 Component  Value  Ref Range  Performed At  Pathologist



         Signature

 

 Case number  [id number]    [facility] DEPARTMENT OF  



       PATHOLOGY AND  



       GENOMIC MEDICINE  

 

 Surgical pathology  See link below    [facility] DEPARTMENT OF  



 report  for PDF Lab    PATHOLOGY AND  



   Report    GENOMIC MEDICINE  

 

 Result status  This is Final    [facility] DEPARTMENT OF  



   Report for    PATHOLOGY AND  



   [id number]    GENOMIC MEDICINE  









 Specimen

 

 









 Performing Organization  Address  City/[facility]/Zipcode  Phone Number

 

 [facility] DEPARTMENT OF PATHOLOGY AND  [address] [place] [address]  



 GENOMIC MEDICINE      



Prothrombin time with INR (2019  1:04 PM CDT)Only the most recent of2 
resultswithin the time period is included.





 Component  Value  Ref Range  Performed At  Pathologist



         Signature

 

 Prothrombin time  16.6 (H)  11.5 - 14.5  [facility]  

 

 INR  1.4    [facility]  



   Comment:    [facility] International Normalized Ratio (INR) is a therapeutic    HOSPITAL  



   monitoring tool for patients who are stable on oral      



   anticoagulant therapy. An INR of 2.0-3.0 is suggested for deep      



   vein thrombosis/pulmonary embolism.      



         









 Specimen

 

 Blood









 Performing Organization  Address  City/[facility]/Zipcode  Phone Number

 

 [facility] DEPARTMENT OF PATHOLOGY AND  [address] [place] [address]  



Phosphorus level (2019  1:04 PM CDT)





 Component  Value  Ref Range  Performed At  Pathologist Signature

 

 Phosphorus  4.8 (H)  2.4 - 4.5 mg/dL  [facility]  









 Specimen

 

 Plasma specimen









 Performing Organization  Address  City/[facility]  Phone Number

 

 [facility] DEPARTMENT OF PATHOLOGY AND  [address] [place] [address]  



B natriuretic peptide (2019  1:04 PM CDT)





 Component  Value  Ref Range  Performed At  Pathologist Signature

 

 BNP  3,666 (H)  0 - 100 pg/mL  [facility]  









 Specimen

 

 Blood









 Performing Organization  Address  City/[facility]/Zipcode  Phone Number

 

 [facility] DEPARTMENT OF PATHOLOGY AND  [address] [place] [phone]  



 [address] [address]  



Magnesium level (2019  1:04 PM CDT)





 Component  Value  Ref Range  Performed At  Pathologist Signature

 

 Magnesium  2.0  1.6 - 2.4 mg/dL  [facility]  









 Specimen

 

 Plasma specimen









 Performing Organization  Address  City/[facility]/Zipcode  Phone Number

 

 [facility] DEPARTMENT OF PATHOLOGY AND  [address] [place] [address]  



 [address] [address]  



Lipase level (2019  1:04 PM CDT)





 Component  Value  Ref Range  Performed At  Pathologist Signature

 

 Lipase  13  13 - 60 U/L  [facility]  









 Specimen

 

 Plasma specimen









 Performing Organization  Address  City/[facility] [facility]  Phone Number

 

 [facility] DEPARTMENT OF PATHOLOGY AND  [address] [place] [id number] [place] [address]  



Lactic acid level (2019  1:04 PM CDT)Only the most recent of2 
resultswithin the time period is included.





 Component  Value  Ref Range  Performed At  Pathologist Signature

 

 Lactic acid  1.0  0.5 - 2.2 mmol/L  [facility]  









 Specimen

 

 Plasma specimen









 Performing Organization  Address  City/[facility]  Phone Number

 

 [facility] DEPARTMENT OF PATHOLOGY AND  [address] [place] [address]  



Creatine kinase, total (CPK) (2019  1:04 PM CDT)





 Component  Value  Ref Range  Performed At  Pathologist Signature

 

 Creatine kinase  155  39 - 308 U/L  [facility]  









 Specimen

 

 Plasma specimen









 Performing Organization  Address  City/[facility]  Phone Number

 

 [facility] DEPARTMENT OF PATHOLOGY AND  [address] [place] [address]  



Bilirubin direct (2019  1:04 PM CDT)





 Component  Value  Ref Range  Performed At  Pathologist Signature

 

 Bilirubin direct  0.7 (H)  0.0 - 0.3 mg/dL  [facility]  









 Specimen

 

 Plasma specimen









 Performing Organization  Address  City/[facility]  Phone Number

 

 [facility] DEPARTMENT OF PATHOLOGY AND  [address] [place] [address]  



Lipid panel (2019  1:04 PM CDT)Only the most recent of2 resultswithin the 
time period is included.





 Component  Value  Ref Range  Performed At  Pathologist



         Signature

 

 Cholesterol  110  <200 mg/dL  [facility]  

 

 Triglycerides  107  <150 mg/dL  [facility]  

 

 HDL cholesterol  40  >40 mg/dL  [facility]  

 

 LDL cholesterol  55Comment: Result  <100 mg/dL  [facility]  



   obtained by direct    [facility]  



   LDL measurement    [facility]  

 

 [facility]  [facility]  



 interpretation  Comment:    [facility]  



   Total Cholesterol (mg/dL)    HOSPITAL  



    <200 Desirable      



    200-239Borderline-high      



    >=240High      



         



   Triglycerides (mg/dL)      



    <150 Normal      



    [id number]      



    200-499High      



    >=500Very high      



   HDL Cholesterol (mg/dL)      



    <40Low (male)      



    <40Low (female)      



   LDL Cholesterol (mg/dL)      



    <100 Optimal      



    100-129Near or above optimal      



    130-159Borderline-high      



    160-189High      



    >=190Very high      



   
      



   Risk Catergories that modify LDL goals.      



   Risk CatergoriesLDL goal (mg/dL)      



   CHD and CHD risk equivalent<100      



   (10-year risk >20%)      



   Multiple (2+) risk factors <130      



   (10-year risk=<20%)      



   0-1 risk factors <160      



   (<10-year risk)      



   Defining levels of lipids in metabolic syndrome      



   Triglycerides>=150 mg/dL      



   HDL Cholesterol      



   Men<40 mg/dL    
  



   Women<40 mg/dL      



   Non-HDL cholesterol is a second target for therapy in persons      



   with high triglycerides (>=200 mg/dL)      









 Specimen

 

 Plasma specimen









 Performing Organization  Address  City/State/Zipcode  Phone Number

 

 [facility] DEPARTMENT OF PATHOLOGY AND  [address] [place] [address]  



 GENOMIC MEDICINE      

 

 [address] [address]  



Comprehensive metabolic panel (2019  1:04 PM CDT)Only the most recent of2 
resultswithin the time period is included.





 Component  Value  Ref Range  Performed At  Pathologist



         Signature

 

 Sodium  134 (L)  135 - 148  [facility]  



     mEq/L  [facility]  

 

 Potassium  4.5  3.5 - 5.0  [facility]  



     mEq/L  [facility]  

 

 Chloride  93 (L)  98 - 112 mEq/L  [facility]  

 

 CO2  22 (L)  24 - 31 mEq/L  [facility]  

 

 Anion gap  19@ANIO (H)  7 - 15 mEq/L  [facility]  

 

 BUN  54 (H)  8 - 23 mg/dL  [facility]  

 

 Creatinine  7.61 (H)  0.70 - 1.20  [facility]  



     mg/dL  [facility]  

 

 Glucose  153 (H)  65 - 99 mg/dL  [facility]  

 

 Calcium  8.7 (L)  8.8 - 10.2  [facility]  



     mg/dL  [facility]  

 

 Protein  7.5  6.3 - 8.3 g/dL  [facility]  



   Comment:    HOSPITAL  



   [place] 4.6-7.0 g/dL      



   1 week 4.4-7.6 g/dL      



   7 months-1year5.1-7.3 g/dL      



   1-2 years5.6-7.5 g/dL      



   >3 years6.0-8.0 g/dL      



    6.3-8.3 g/dL      



         

 

 Albumin  3.1 (L)  3.5 - 5.0 g/dL  [facility]  

 

 A/G ratio  0.7  0.7 - 3.8  [facility]  

 

 Alkaline phosphatase  485 (H)  40 - 129 U/L  [facility]  

 

 AST  36  10 - 50 U/L  [facility]  

 

 ALT  39  5 - 50 U/L  [facility]  

 

 Total bilirubin  1.0  0.0 - 1.2  [facility]  



     mg/dL  [facility]  









 Specimen

 

 Plasma specimen









 Performing Organization  Address  City/[facility]  Phone Number

 

 [facility] DEPARTMENT OF PATHOLOGY AND  [address] [place] [address] [id number] [place] [phone]  



Hemoglobin A1c (2019  1:03 PM CDT)Only the most recent of2 resultswithin 
the time period is included.





 Component  Value  Ref Range  Performed At  Pathologist



         Signature

 

 Hemoglobin A1C  5.1  4.0 - 5.6 %  [facility]  



   Comment:    HOSPITAL  



   HbA1c cutoffs for diagnosing diabetes:      



   4.0% - 5.6%=normal      



   5.7% - 6.4%=increased risk for diabetes (prediabetes)      



   >=6.5%=diabetes      



   Goals for glycemic control (ADA 2016)      



   < 7.0%Target for nonpregnant adults with diabetes.      



   More or less stringent targets may be appropriate for      



   individual patients.      



   <7.5% Target for Children and adolescents with type 1      



   diabetes.      



         









 Specimen

 

 Blood









 Performing Organization  Address  City/[facility]  Phone Number

 

 [facility] DEPARTMENT OF PATHOLOGY AND  [address] [place] [id number] [place] [address]  



Prealbumin level (2019 12:03 PM CDT)





 Component  Value  Ref Range  Performed At  Pathologist Signature

 

 Prealbumin  22  16 - 32 mg/dL  [facility]  









 Specimen

 

 Serum









 Performing Organization  Address  City/[facility]  Phone Number

 

 [facility] DEPARTMENT OF PATHOLOGY AND  [address] [place] [phone]  



 [address] [address]  



Sodium level, syringe (2019  8:55 AM CDT)Only the most recent of2 
resultswithin the time period is included.





 Component  Value  Ref Range  Performed At  Pathologist Signature

 

 Sodium, syringe  133 (L)  135 - 148 mEq/L  [facility]  









 Specimen

 

 Blood









 Performing Organization  Address  City/[facility]  Phone Number

 

 [facility] DEPARTMENT OF PATHOLOGY AND  [address] [place] [address]  



Potassium, syringe (2019  8:55 AM CDT)Only the most recent of2 
resultswithin the time period is included.





 Component  Value  Ref Range  Performed At  Pathologist Signature

 

 Potassium, syringe  3.9  3.5 - 5.0 mEq/L  [facility]  









 Specimen

 

 Blood









 Performing Organization  Address  City/[facility]  Phone Number

 

 [facility] DEPARTMENT OF PATHOLOGY AND  [address] [place] [address]  



Ionized calcium, arterial (2019  8:55 AM CDT)





 Component  Value  Ref Range  Performed At  Pathologist Signature

 

 Ionized calcium,  0.99 (L)  1.11 - 1.32  [facility]  



 arterial    mmol/L  [facility]  









 Specimen

 

 Blood









 Performing Organization  Address  City/[facility]  Phone Number

 

 [facility] DEPARTMENT OF PATHOLOGY AND  [address] [place] [address]  



Hemoglobin, syringe (2019  8:55 AM CDT)Only the most recent of2 
resultswithin the time period is included.





 Component  Value  Ref Range  Performed At  Pathologist Signature

 

 Hemoglobin, syringe  10.2 (L)  14.0 - 18.0 g/dL  [facility]  









 Specimen

 

 Blood









 Performing Organization  Address  City/[facility]  Phone Number

 

 [facility] DEPARTMENT OF PATHOLOGY AND  [address] [place] [address]  



Glucose level, syringe (2019  8:55 AM CDT)Only the most recent of2 
resultswithin the time period is included.





 Component  Value  Ref Range  Performed At  Pathologist Signature

 

 Glucose, syringe  114 (H)  65 - 99 mg/dL  [facility]  









 Specimen

 

 Blood









 Performing Organization  Address  City/[facility]  Phone Number

 

 [facility] DEPARTMENT OF PATHOLOGY AND  [address] [place] [address]  



Arterial blood gas, corrected (2019  8:55 AM CDT)





 Component  Value  Ref Range  Performed At  Pathologist Signature

 

 pH, arterial  7.34 (L)  7.35 - 7.45  [facility]  

 

 pCO2, arterial  39  35 - 45 mmHg  [facility]  

 

 pO2, arterial  250 (H)  80 - 90 mmHg  [facility]  

 

 Temperature, Celsius  36.2  Degrees C  [facility]  

 

 O2 saturation,  100  95 - 100 %  [facility]  



 arterial      HOSPITAL  

 

 pH, arterial  7.36    [facility]      HOSPITAL  

 

 pCO2, arterial  38  mmHg  [facility]  



 corrected      HOSPITAL  

 

 pO2, arterial  247  mmHg  [facility]      HOSPITAL  

 

 Base excess, arterial  -4 (L)  -2 - 2 mEq/L  [facility]  









 Specimen

 

 Blood









 Performing Organization  Address  City/[facility]  Phone Number

 

 [facility] DEPARTMENT OF PATHOLOGY AND  [address] [place] [address]  



Prepare RBC (2019 11:39 AM CDT)





 Component  Value  Ref Range  Performed At  Pathologist



         Signature

 

 Product name  Apheresis Red Cell    [facility]  



   AS3 #2 LR    HOSPITAL  

 

 Unit number  [id number]    [facility]  

 

 Product code  [id number]    [facility]  

 

 Dispense status  Returned to  not    [facility]  



   transfused    HOSPITAL  

 

 Blood expiration      [facility]  

 

 Blood type code  5100    [facility]  

 

 Blood type  O POSITIVE    [facility]  

 

 Product name  Red Blood Cells    [facility]  



   AS-1, Leukored    HOSPITAL  

 

 Unit number  [id number]    [facility]  

 

 Product code  [id number]    [facility]  

 

 Dispense status  Returned to BB not    [facility]  



   transfused    HOSPITAL  

 

 Blood expiration      [facility]  

 

 Blood type code  5100    [facility]  

 

 Blood type  O POSITIVE    [facility]  









 Specimen

 

 









 Performing Organization  Address  City/State/Zipcode  Phone Number

 

 [facility] DEPARTMENT OF PATHOLOGY AND  [address] [place] [address]  



Type and screen (2019 11:39 AM CDT)Only the most recent of2 resultswithin 
the time period is included.





 Component  Value  Ref Range  Performed At  Pathologist Signature

 

 ABO grouping  O    [facility]  

 

 Rh type  POS    [facility]  

 

 Antibody screen (gel)  NEG    [facility]  









 Specimen

 

 Blood









 Performing Organization  Address  City/State/Zipcode  Phone Number

 

 [facility] DEPARTMENT OF PATHOLOGY AND  [address] [place] [address]  



 GENOMIC MEDICINE      

 

 [facility]  [address] [place] [address]  



XR Chest 2 Vw (2019 12:44 PM CDT)





 Specimen

 

 









 Narrative  Performed At

 

 EXAMINATION:XR CHEST 2 VW



   [facility]



  



  



 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  



 evaluation



  



  



  



 COMPARISON:None.



  



  



  



 IMPRESSION:



  



 1.Lungs are clear.



  



 2.Mediastinal contours and cardiac silhouette are unremarkable.



  



 3.No acute osseous abnormality.



  



  



  



 [facility]-[id number]  









 Procedure Note

 

 Hm Interface, Radiology Results Incoming - 2019 12:55 PM CDT



EXAMINATION:  XR CHEST 2 VW



 



 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 
evaluation



 



 COMPARISON:  None.



 



 IMPRESSION:



 1.  Lungs are clear.



 2.  Mediastinal contours and cardiac silhouette are unremarkable.



 3.  No acute osseous abnormality.



 



 [facility]-[id number]









 Performing Organization  Address  City/[facility]  Phone Number

 

  [facility]  [address] [place] [address]  



ECG Pre/Post Op (2019 11:54 AM CDT)





 Component  Value  Ref Range  Performed At  Pathologist Signature

 

 Ventricular rate  50    [facility]  

 

 Atrial rate  50    [facility]  

 

 MT interval  210    [facility]  

 

 QRSD interval  114    [facility]  

 

 QT interval  496    [facility]  

 

 QTC interval  452    [facility]  

 

 P axis 1  65    [facility]  

 

 QRS axis 1  11    [facility]  

 

 T wave axis  -21    [facility]  

 

 EKG impression  Sinus bradycardia with    [facility]  



   1st degree AV      



   block-Incomplete right      



   bundle branch      



   block-Borderline      



   ECG-Electronically      



   Signed By Jonny Delatorre MD ([id number]) on      



   2019 7:23:53 PM      









 Specimen

 

 









 Narrative  Performed At

 

 This result has an attachment that is not available.



  









 Performing Organization  Address  City/State/Zipcode  Phone Number

 

 [facility]  [address] [place] [address]  



Vancomycin level, random (06/10/2019  4:00 AM CDT)





 Component  Value  Ref Range  Performed At  Pathologist Signature

 

 Vancomycin, random  19.5  ug/mL  [facility]  









 Specimen

 

 Serum









 Performing Organization  Address  City/State/Zipcode  Phone Number

 

 [facility] DEPARTMENT OF PATHOLOGY AND  [place] [address]  



 [facility]  [address] [place] [address]  



C difficile toxin (2019  1:16 PM CDT)





 Component  Value  Ref Range  Performed At  Pathologist



         Signature

 

 Clostridium  No Clostridium difficle toxin present    [facility]  



 difficile toxin  Comment:    HOSPITAL  



   Specimen Information      



   Specimen Source: Stool      



   Specimen Site: Nonpreserved      



         









 Specimen

 

 Stool - Nonpreserved









 Performing Organization  Address  City/State/Zipcode  Phone Number

 

 [facility] DEPARTMENT OF PATHOLOGY AND  [address] [place] [address]  



 [facility]  [address] [place] [address]  



CTA Abdomen Pelvis W And Or Wo Contrast (2019  8:00 PM CDT)





 Specimen

 

 









 Narrative  Performed At

 

 EXAMINATION:CT ANGIOGRAM ABDOMEN PELVIS W AND OR WO CONTRAST



   [facility]



  



  



 CLINICAL HISTORY:visualize right femoral PSA



  



  



  



 TECHNIQUE: Multiple CT angiographic images of the abdomen and pelvis  



 were obtained during intravenous administration of contrast. Multiple  



 computerized reformatted images as well as 3-D volume rendered images  



 were also obtained.CT imaging was performed 



  



 with iterative reconstruction technique and/or automated exposure  



 control to reduce radiation dose.



  



  



  



 Precontrast images of the abdomen were also obtained.



  



  



  



 COMPARISON:None.



  



  



  



 FINDINGS:



  



  



  



 Abdomen:



  



  There is a moderate to marked cardiomegaly. Lung bases are  



 unremarkable. Tiny hyperdense focus is seen in the posterior right  



 hepatic lobe measuring 3 mm. This could be hemorrhagic cyst or other.  



 Contrast-enhanced exam could better assess. 



  



  



  



 Gallbladder, pancreas, spleen, and adrenals are normal in appearance.  



 The kidneys are small in size. Vascular calcifications are seen. There  



 is mild ascites. Appendix is not definitely seen. Tiny left para-aortic  



 lymph nodes are seen.



  



  



  



 Pelvis:



  



  Small amount of pelvic free fluid is seen. No enlarged pelvic lymph  



 node or mass is seen. Bilateral inguinal hernias containing fat only are  



 seen.



  



  



  



 CTA: 



  



 Abdominal aorta is of normal caliber but markedly atherosclerotic.  



 Marked atherosclerosis is seen at the origin of the celiac artery with  



 moderate dilatation. 



  



  



  



 Moderate atherosclerosis is seen at the origin of the SMA with mild  



 dilatation. Mild left renal artery origin atherosclerosis is seen. Right  



 and left renal arteries are patent. Marked CARO laparoscopic  



 calcification is seen but this vessel appears patent.



  



  Atherosclerosis is present throughout the common, internal, and  



 external iliac arteries without occlusion.



  



  



  



 There is a right superficial femoral artery pseudoaneurysm which  



 measures 3.9 x 3.5 cm. Enhancing lumen within this pseudoaneurysm is 2.5  



 x 2.0 cm. The left is mural thrombus with some scattered calcification  



 and/or hemorrhage.



  



  



  



  



  



 IMPRESSION:



  



 Right femoral artery pseudoaneurysm as described.



  



 Cardiomegaly. Nonspecific hyperdense right hepatic lobe lesion.Tiny left  



 para-aortic lymph nodes. Extensive atherosclerosis.



  



  



  



 [facility]-[id number]  









 Procedure Note

 

 Hm Interface, Radiology Results Incoming - 2019 11:45 PM CDT



EXAMINATION:  CT ANGIOGRAM ABDOMEN PELVIS W AND OR WO CONTRAST



 



 CLINICAL HISTORY:  visualize right femoral PSA



 



 TECHNIQUE: Multiple CT angiographic images of the abdomen and pelvis were 
obtained during intravenous administration of contrast. Multiple computerized 
reformatted images as well as 3-D volume rendered images were



 also obtained.CT imaging was performed



 with iterative reconstruction technique and/or automated exposure control to 
reduce radiation dose.



 



   Precontrast images of the abdomen were also obtained.



 



 COMPARISON:  None.



 



 FINDINGS:



 



 Abdomen:



  There is a moderate to marked cardiomegaly. Lung bases are unremarkable. Tiny 
hyperdense focus is seen in the posterior right hepatic lobe measuring 3 mm. 
This could be hemorrhagic cyst or other. Contrast-enhanced exam could better 
assess.



 



 Gallbladder, pancreas, spleen, and adrenals are normal in appearance. The 
kidneys are small in size. Vascular calcifications are seen. There is mild 
ascites. Appendix is not definitely seen. Tiny left para-aortic lymph nodes are 
seen.



 



 Pelvis:



  Small amount of pelvic free fluid is seen. No enlarged pelvic lymph node or 
mass is seen. Bilateral inguinal hernias containing fat only are seen.



 



 CTA:



 Abdominal aorta is of normal caliber but markedly atherosclerotic. Marked 
atherosclerosis is seen at the origin of the celiac artery with moderate 
dilatation.



 



 Moderate atherosclerosis is seen at the origin of the SMA with mild 
dilatation. Mild left renal artery origin atherosclerosis is seen. Right and 
left renal arteries are patent. Marked CARO laparoscopic calcification



 is seen but this vessel appears patent.



  Atherosclerosis is present throughout the common, internal, and external 
iliac arteries without occlusion.



 



 There is a right superficial femoral artery pseudoaneurysm which measures 3.9 
x 3.5 cm. Enhancing lumen within this pseudoaneurysm is 2.5 x 2.0 cm. The left 
is mural thrombus with some scattered calcification and/or hemorrhage.



 



 



 IMPRESSION:



 Right femoral artery pseudoaneurysm as described.



 Cardiomegaly. Nonspecific hyperdense right hepatic lobe lesion.Tiny left para-
aortic lymph nodes. Extensive atherosclerosis.



 



 [facility]-[id number]









 Performing Organization  Address  City/[facility]  Phone Number

 

  [facility]  [address] [place] [address]  



Blood culture, aerobic &amp; anaerobic (2019  7:55 PM CDT)Only the most 
recent of2 resultswithin the time period is included.





 Component  Value  Ref Range  Performed At  Pathologist



         Signature

 

 Blood culture  No growth after 5 days of incubation.    [facility]  



 isolate  Comment:    HOSPITAL  



   Specimen Information      



   Specimen Source: Blood      



   Specimen Site: Unspecified      



         









 Specimen

 

 Blood









 Performing Organization  Address  City/[facility]  Phone Number

 

 [facility] DEPARTMENT OF PATHOLOGY AND  [address] [place] [address]  



Lactic acid level, SEPSIS - Now and repeat 2x every 3 hours (2019  7:30 
PM CDT)





 Component  Value  Ref Range  Performed At  Pathologist Signature

 

 Lactic acid  1.0  0.5 - 2.2 mmol/L  [facility]  









 Specimen

 

 Blood









 Performing Organization  Address  City/[facility]  Phone Number

 

 [facility] DEPARTMENT OF PATHOLOGY AND  [address] [place] [address]  



Us duplex venous lower extremity (2019  6:05 PM CDT)





 Specimen

 

 









 Narrative  Performed At

 

  



  [facility]



 Vascular Ultrasound Laboratory



  



 Lower Extremity Venous Report



  



  [address] [place] [address]



  



  



  



 Pat.Name:KYLE SOARES  



 Pat.ID:[id number] 



  



 St.Date:  



 2019Refer.MD:PHYSICIAN,  



 EMERGENCY, MD



  



 Exam Time: 5:22:00 PMStudy Type:LE  



 Venous 



  



 Height:68inWeight:  



 187lb 



  



 BSA: 1.99 m2  



 DOBAge:1955,63Y



  



 Sex: MALESonogrphr:  



 Aimee Rutherford RVT 



  



 Pat. Stat.:Inpatient  



 Room:EDT 



  



 TapeVol: ROBERT, CPT - 4:  



 [address] 



  



 Echo Event ID:[id number] 



  



 Order ID:[id number]



  



 Reason for Study:Right leg swelling and pain. History of DM, HTN,



  



 end-stage renal disease, peripheral vascular disease.



  



 Procedures:Colorflow, Grayscale/2D, Pulsed wave Doppler



  



 Race:Other 



  



 ------------------------------------



  



 SUMMARY:



  



 ------------------------------------



  



 * Normal Reflux Criteria:< 0.5 seconds 



  



  * Abnormal Reflux Criteria:> or equal to 0.5 seconds



  



  



  



  



  



  DUPLEX SCAN OBSERVATIONS



  



  



  



  Deep VeinsSuperficial Veins



  



 RightLeft RightLeft



  



  GSV (prox) Normal



  



  CFV Pulsatile Pulsatile (above knee)



  



  Femoral PulsatileGSV (dist) Normal 



  



  Profunda Pulsatile(below knee)



  



  Popliteal Pulsatile 



  



  PT (prox) Normal SSV Normal 



  



  PT (dist) Normal



  



  Peroneal Normal



  



  Gastrocs Normal



  



  



  



  RIGHT: There is normal compressibility with no evidence of echogenic



  



 material noted within the lumen of the visualized veins. Color flow



  



 and Doppler signals are pulsatile in the thigh veins. Incident



  



 finding: There is partially thrombosed pseudoaneurysm coming from



  



 proximal superficial femoral artery; measures approximately 5.33 x 2.5



  



 cm in long with an active lumen that measures approximately 2.5 cm x



  



 1.4 cm. 



  



  



  



  LEFT: There is normal compressibility with no evidence of echogenic



  



 material noted within the lumen of the common femoral vein. Colorflow



  



 and Doppler signals are pulsatile in the common femoral vein. 



  



  



  



  PRELIMINARY FINDINGS



  



  1. No evidence of venous thrombosis in the visualized veins.



  



  2. Color flow and Doppler signals are pulsatile in the right thigh



  



 veins and left common femoral vein.



  



  3. Incidental finding: partially thrombosed pseudoaneurysm coming



  



 from proximal right superficial femoral artery: measures approximately



  



 5.33 x 2.5 cm in long with an active lumen that measures approximately



  



 2.5 cm x 1.4 cm. 



  



  4. Preliminary result reported to Dr. Perkins @ 18:20.



  



  



  



  PHYSICIAN INTERPRETATION 



  



  Venous examination of the right lower extremity and leftgroin



  



 demonstrated no evidence of venous thrombosis in the visualized veins.



  



 Volume overload.



  



  



  



  Incidental finding: partially thrombosed pseudoaneurysm coming from



  



 proximal right superficial femoral artery: measures approximately 5.33



  



 x 2.5 cm in long with an active lumen that measures approximately 2.5



  



 cm x 1.4 cm.



  



 Signed 2019 08:14 AM



  



 Sharan Machado MD, RPVI  









 Procedure Note

 

 Interface, Radiology Results In - 2019  8:15 AM CDT







                     Vascular Ultrasound Laboratory



                     Lower Extremity Venous Report



            [address] [address]



 



 Pat.Name:  KYLE SOARES           Pat.ID:    [id number]



 .Date:   2019                Refer.MD:  PHYSICIAN, EMERGENCY, MD



 Exam Time: 5:22:00 PM              Study Type:LE Venous



 Height:    68in                    Weight:    187lb



 BSA:       1.99 m2                   Age:  1955,63Y



 Sex:       MALE                    Sonogrphr: Aimee Rutherford RVT



 Pat. Stat.:Inpatient               Room:      EDT



 Tape  Vol: LN,                     CPT - 4:   [address]231



 Echo Event ID:[id number]



 Order ID:  [id number]



 Reason for Study:Right leg swelling and pain. History of DM, HTN,



 end-stage renal disease, peripheral vascular disease.



 Procedures:Colorflow, Grayscale/2D, Pulsed wave Doppler



 Race:      Other



 ------------------------------------



 SUMMARY:



 ------------------------------------



 * Normal Reflux Criteria:  < 0.5 seconds



  * Abnormal Reflux Criteria:  > or equal to 0.5 seconds



 



 



  DUPLEX SCAN OBSERVATIONS



 



    Deep Veins  Superficial Veins



   Right  Left Right  Left



      GSV (prox) Normal



  CFV Pulsatile Pulsatile (above knee)



  Femoral Pulsatile  GSV (dist) Normal



  Profunda Pulsatile  (below knee)



  Popliteal Pulsatile



  PT (prox) Normal   SSV Normal



  PT (dist) Normal



  Peroneal Normal



  Gastrocs Normal



 



  RIGHT: There is normal compressibility with no evidence of echogenic



 material noted within the lumen of the visualized veins. Color flow



 and Doppler signals are pulsatile in the thigh veins. Incident



 finding: There is partially thrombosed pseudoaneurysm coming from



 proximal superficial femoral artery; measures approximately 5.33 x 2.5



 cm in long with an active lumen that measures approximately 2.5 cm x



 1.4 cm.



 



  LEFT: There is normal compressibility with no evidence of echogenic



 material noted within the lumen of the common femoral vein. Colorflow



 and Doppler signals are pulsatile in the common femoral vein.



 



  PRELIMINARY FINDINGS



  1. No evidence of venous thrombosis in the visualized veins.



  2. Color flow and Doppler signals are pulsatile in the right thigh



 veins and left common femoral vein.



  3. Incidental finding: partially thrombosed pseudoaneurysm coming



 from proximal right superficial femoral artery: measures approximately



 5.33 x 2.5 cm in long with an active lumen that measures approximately



 2.5 cm x 1.4 cm.



  4. Preliminary result reported to Dr. Perkins @ 18:20.



 



  PHYSICIAN INTERPRETATION



  Venous examination of the right lower extremity and left  groin



 demonstrated no evidence of venous thrombosis in the visualized veins.



   Volume overload.



 



  Incidental finding: partially thrombosed pseudoaneurysm coming from



 proximal right superficial femoral artery: measures approximately 5.33



 x 2.5 cm in long with an active lumen that measures approximately 2.5



 cm x 1.4 cm.



 Signed 2019 08:14 AM



 Sharan Machado MD, RPVI









 Performing Organization  Address  City/[facility]  Phone Number

 

 [facility]  [address] [place] [address]  



Partial thromboplastin time, activated (2019  4:34 PM CDT)





 Component  Value  Ref Range  Performed At  Pathologist



         Signature

 

 PTT  34.1  23.0 - 36.0  [facility]  



   Comment:  [facility]  



   PTT therapeutic range for unfractionated heparin is      



   61.0-112.0 seconds which corresponds to Anti-Xa      



   0.3-0.7 U/ml.      



         









 Specimen

 

 Blood









 Performing Organization  Address  City/[facility]  Phone Number

 

 [facility] DEPARTMENT OF PATHOLOGY AND  [address] [place] [id number]  



 GENOMIC MEDICINE      

 

 [address] [address] duplex arterial lower extremity (2019  9:02 AM CDT)





 Specimen

 

 









 Narrative  Performed At

 

 [facility]IPHER[facility] VASCULAR LABORATORY



  [facility]



  



  



 Lower Extremity Arterial Duplex Report



  



  



  



 [address] [facility] [address], [place], [place] [phone]



  



  



  



 Pat.Name:KYLE SOARES  



 Pat.ID:[id number] 



  



 .Date: 2019Refer.MD:MIRANDA ERNANDEZ MD 



  



 Exam Time: 8:15:00 AMStudy Type:LE  



 Arterial 



  



 DOBAge:1955,63YSex:  



 MALE



  



 Sonogrphr: Nazanin Tran RN, RVT CPT - 4:  



 [address] 



  



 Echo Event ID:[id number] 



  



 Order ID:[id number]



  



 Reason for Study:Hard "knot" to right groin noted by pt's podiatrist.



  



 Patient denies pain or being aware of "knot". Aortogram 16 with



  



 right femoral approach per pt. Diabetic, ESRD, hypertenion, Lt arm 



  



 AVF for hemodialysis., PAD. 



  



 Race:Other 



  



 ------------------------------------



  



 SUMMARY:



  



 ------------------------------------



  



 DUPLEX SCAN OBSERVATIONS:



  



  RIGHT:There is smooth intimal lining in the distal external iliac



  



 artery, common femoral, proximal and mid superficial femoral and



  



 proximal profunda femoris artery where colorflow is present.The



  



 distal external iliac, common femoral, proximal and mid femoral, and



  



 proximal profunda femoris vein are compressible, echo-free with



  



 spontaneous, phasic color flow.A 3.4 x 4.1 cm mixed hypoechoic and



  



 hyperechoic area is seen off the common femoral vein in the groin; the



  



 patent portion measures 2.0 x 2.6 cm. The neck measures 0.21 cm in



  



 length and 0.19 cm in diameter with two-fro elevated Doppler signals.



  



 Velocities in the common femoral vein are elevated deep to the



  



 pseudoaneurysm (80 cm/sec); flow proximal in CFV is 20 cm/sec).



  



 Calcium lines the arteries imaged. A mixed echoic structure is seen



  



 superficial to the pseudoaneurysm measuring 1.4 x 2.7 cm with minimal



  



 colorflow. Venous flow is pulsatile in the veins assessed. 



  



  



  



  DOPPLER FINDINGS:



  



  ARTERYLOCATIONPSV (cm/sec)



  



  RIGHTExternal Iliac [id number]



  



  Common Femoral Proximal-third 181



  



  [id number]



  



  Profunda Femoris Proximal-third 



  



  Superficial Femoral Proximal-third 93



  



  Proximal-third 211



  



  Proximal-third 218



  



  Mid-ohlfz640



  



  



  



  Pseudoaneurysmneck [id number]



  



  Rhviyl220



  



  intra-pseudoaneurysm 288



  



  



  



  VEINSLOCATION



  



  Common Femoral Proximal-third Patent



  



  Profunda Femoris Proximal-third Patent



  



  Superficial Femoral Proximal-third Patent



  



  



  



  PRELIMINARY FINDINGS:



  



  1.A partially vascularized structure containing partial bright



  



 echoes measuring 3.4 x 4.1 cm is seen in the right groin off the



  



 common femoral artery. The patent portion containing colorflow



  



 measures 2.0 x 2.6 cm. The neck measures 0.21 cm in length and 0.19 cm



  



 in diameter with two-fro flow andelevated Doppler velocities.



  



  2.Patent right distal external iliac, common femoral, profunda



  



 femoris and proximal and mid superficial femoral artery. 



  



  3.Patent right distal external iliac, common femoral, profunda



  



 femoris and proximal and mid superficial femoral vein.Venous flow is



  



 pulsatile in the veins assessed in the right leg.. 



  



  4. Velocities in the right common femoral vein are elevated deep to



  



 the pseudoaneurysm (80 cm/sec); flow proximal in CFV is 20 cm/sec.



  



 This may suggest partial compression of the vein by the



  



 pseudoaneurysm.



  



  5.Calcium lines the arteries imaged. 



  



  6.A mixed echoic structure is seen superficial to the



  



 pseudoaneurysm measuring 1.4 x 2.7 cm with minimal colorflow, this may



  



 be c/w a lymph node.



  



  



  



  PHYSICIAN INTERPRETATION:



  



  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



  



 vein



  



  



  



  



  



  



  



 



  



  



  



 Signed 2019 06:31 AM



  



 Mike Valerio MD, RPVI  









 Procedure Note

 

 Interface, Radiology Results In - 2019  6:32 AM CDT



                    PERIPHERAL VASCULAR LABORATORY



 



                 Lower Extremity Arterial Duplex Report



 



   [address] [address], [place] [id number] [phone]



 



 Pat.Name:  KYLE SOARES           [facility].ID:    [id number]



 .Date:   2019                Refer.MD:  MIRANDA ERNANDEZ MD



 Exam Time: 8:15:00 AM              Study Type:LE Arterial



   Age:  1955,63Y            Sex:       MALE



 Sonogrphr: Nazanin Tran RN, RVT CPT - 4:   [id number]



 Echo Event ID:[id number]



 Order ID:  [id number]



 Reason for Study:Hard "knot" to right groin noted by pt's podiatrist.



 Patient denies pain or being aware of "knot". Aortogram 16 with



 right femoral approach per pt. Diabetic, ESRD, hypertenion, Lt arm



 AVF for hemodialysis., PAD.



 Race:      Other



 ------------------------------------



 SUMMARY:



 ------------------------------------



 DUPLEX SCAN OBSERVATIONS:



  RIGHT:  There is smooth intimal lining in the distal external iliac



 artery, common femoral, proximal and mid superficial femoral and



 proximal profunda femoris artery where colorflow is present.  The



 distal external iliac, common femoral, proximal and mid femoral, and



 proximal profunda femoris vein are compressible, echo-free with



 spontaneous, phasic color flow.  A 3.4 x 4.1 cm mixed hypoechoic and



 hyperechoic area is seen off the common femoral vein in the groin; the



 patent portion measures 2.0 x 2.6 cm. The neck measures 0.21 cm in



 length and 0.19 cm in diameter with two-fro elevated Doppler signals.



 Velocities in the common femoral vein are elevated deep to the



 pseudoaneurysm (80 cm/sec); flow proximal in CFV is 20 cm/sec).



 Calcium lines the arteries imaged. A mixed echoic structure is seen



 superficial to the pseudoaneurysm measuring 1.4 x 2.7 cm with minimal



 colorflow. Venous flow is pulsatile in the veins assessed.



 



  DOPPLER FINDINGS:



    ARTERY  LOCATION  PSV (cm/sec)



  RIGHT  External Iliac   Distal-third  215



    Common Femoral Proximal-third 181



      Mid-third  169



    Profunda Femoris Proximal-third



    Superficial Femoral Proximal-third 93



      Proximal-third 211



      Proximal-third 218



      Mid-third  153



 



    Pseudoaneurysm  neck Proximal  194



      Distal  216



      intra-pseudoaneurysm 288



 



    VEINS  LOCATION



    Common Femoral Proximal-third Patent



    Profunda Femoris Proximal-third Patent



    Superficial Femoral Proximal-third Patent



 



  PRELIMINARY FINDINGS:



  1.  A partially vascularized structure containing partial bright



 echoes measuring 3.4 x 4.1 cm is seen in the right groin off the



 common femoral artery. The patent portion containing colorflow



 measures 2.0 x 2.6 cm. The neck measures 0.21 cm in length and 0.19 cm



 in diameter with two-fro flow and  elevated Doppler velocities.



  2.  Patent right distal external iliac, common femoral, profunda



 femoris and proximal and mid superficial femoral artery.



  3.  Patent right distal external iliac, common femoral, profunda



 femoris and proximal and mid superficial femoral vein.  Venous flow is



 pulsatile in the veins assessed in the right leg..



  4.   Velocities in the right common femoral vein are elevated deep to



 the pseudoaneurysm (80 cm/sec); flow proximal in CFV is 20 cm/sec.



 This may suggest partial compression of the vein by the



 pseudoaneurysm.



  5.  Calcium lines the arteries imaged.



  6.  A mixed echoic structure is seen superficial to the



 pseudoaneurysm measuring 1.4 x 2.7 cm with minimal colorflow, this may



 be c/w a lymph node.



 



  PHYSICIAN INTERPRETATION:



  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



 vein



 



 



 



 



 



 Signed 2019 06:31 AM



 Mike Valerio MD, RPVI









 Performing Organization  Address  City/State[facility]  Phone Number

 

  [facility]  [address] [place] [address]  



after 2018



Insurance







 Payer  Benefit Plan / Group  Subscriber ID  Effective Dates  Phone  Address  
Type

 

 [facility] MEDICARE  [facility] DUAL COMPLETE MCR  xxxxxxxxx  2018-Present      O









 Guarantor Name  Account Type  Relation to  Date of Birth  Phone  Billing



     Patient      Address

 

 Kyle Soares  Personal/Family  Self  1955  [phone]  [address]







         (Home)



  [place], [place]



         [phone]  [phone]



         (Work)  







Advance Directives

For more information, please contact: [phone]





 Type  Date Recorded  Patient Representative  Explanation

 

 Advance Directives, Living Will and      



 Medical Power of       









 Code Status  Date Activated  Date Inactivated  Comments

 

 Full Code  2016  7:02 PM  2016  3:56 PM  









 Code Status decision reached by:  Patient  









       

 

 Full Code  2016 10:31 PM  2016 11:47 PM  









 Code Status decision reached by:  Patient

## 2019-08-20 NOTE — XMS REPORT
Continuity of Care Document

 Created on:2019



Patient:HOMER SOARES

Sex:Male

:1955

External Reference #:[id number]





Demographics







 Address  [address]



   [place] [address]

 

 Home Phone  [phone]

 

 Preferred Language  en-US

 

 Marital Status  Unknown

 

 Buddhism Affiliation  Unknown

 

 Race  Unknown

 

 Ethnic Group  Unknown









Author







 Organization  [employer] [facility] [employer]









Care Team Providers







 Name  Role  Phone

 

 [employer]  Unavailable  Unavailable









Problems







 Problem  Status  Onset  Classification  Date  Comments  Source



     Date    Reported    



             



             



             

 

 LABH  Active  20        [address]



             Center



             



             

 

 LAB  Active  20        [address]



             



             

 

 CCL/LHC W/  Active  20        [facility]



 POSS/BILATERAL    [address] [place] [address]



 LOWER EXTREMIT            Center



             



             

 

 ABN STRESS TEST,  Active  20        [facility]



 CLAUDICATION    [address] [place] [address]



             



             

 

 ESRD  Active  20        [address]



             Center



             



             

 

 BDDC/  Active  20        [address]



             Center



             



             

 

 COLONOSCOPY  Active  20        [facility]



 SCREENING            Medical



             Center



             



             

 

 UPDATE VISIT  Active  11/15/20        [place] [address]        Medical



             Center



             



             

 

 CLAUDICATION  Active  20        [place] [address]        Medical



             Center



             



             

 

 FOLLOW UP  Active  20        [address]



             Center



             



             

 

 RENAL PRE OPEN  Active  20        [facility]



 ACCT    15        Medical



             Center



             



             

 

 CARDIAC CLEARNCE/  Active  20        [facility]



 PRE KIDNEY    15        Medical



 TRANSPLANT            Center



             



             

 

 PA KIDNEY ACCT  Active  20        [facility]



 FOR FC NOTES    15        Medical



 ONLYE            Center



             



             

 

 T-SPOT  Active  20        [place] [address]        Medical



             Center



             



             

 

 ESRD**INCLUDE  Active  20        [facility]



 ILIACS**    14        Medical



             Center



             



             

 

 ESRD/ PRE  Active  20        [facility]



 TRANSPLANT WORK    14        Medical



 UP/  INCLUDE E            Center



             



             

 

 Discharge    04/08/20    04/10/2014    [facility]



 Diagnosis:    14        Medical



 Hypertension            Center



             



             

 

 HIGH BLOOD  Active  20        [facility]



 PRESSURE    14        Medical



             Center



             



             

 

 UPDATE  Active  20        [place] [address]        Medical



             Center



             



             

 

 BDDC-RECTAL  Active  20        [facility]



 CANCER SCREENING    13        Medical



             Center



             



             

 

 KIDNEY TX/CARDIAC  Active  20        [facility]



 CLEARENCE PER    13        Medical



 JOSESITO GR            Center



             



             

 

 COLONSCOPY  Active  20        [facility]



 CLEARANCE    13        Medical



             Center



             



             

 

 RENAL/DONOT USE  Active  20        [facility]



 THIS ACCT FOR    13        Medical



 CHARGES F/            Center



             



             

 

 PRE TRANSPLANT  Active  20        [facility]



 EVAL    13        Medical



             Center



             



             

 

 Pulmonary  Active  09/23/20  Problem  2017    [facility]



 hypertension    12        Medical



             Center,



             OP[facility]AKI [place]



             



             

 

 Diabetes  Resolved    Problem  2013    [facility]



             



             

 

 Hypertension  Resolved    Problem  2013    [facility]



             



             

 

 renal failure  Resolved    Problem  2015    [facility]



             



             

 

 DM II [Diabetes  Active    Problem  2016    [facility]



 mellitus type II]            [facility]



             Center



             



             

 

 Essential  Active    Problem  2015    [facility]



             



             

 

 Kidney transplant  Active    Problem  2017    [facility]



 evaluation            [facility],



             [facility] [place]



             



             

 

 Coronary artery  Active    Problem  2017    [facility],



             OP[facility] [place]



             



             

 

 End-stage renal  Active    Problem  2017    [facility]



 (Confirmed)            Center,



             OPID



             [place]



             



             

 

 Essential  Active    Problem  2016    [facility]            Center



             



             

 

 History and  Active    Problem  2017    [facility]



 physical            Medical



 examination,            Center,



 annual for health            [facility]



 maintenance            [place]



             



             

 

 Obesity  Active    Problem  2017    [facility],



             [place]



             



             

 

 Polyp of colon,  Active    Problem  2017    [facility],



             [place]



             



             

 

 DM II [Diabetes  Active    Problem  2017    [facility]



 mellitus type II]            Medical



             Center,



             OPID



             [place]



             



             

 

 Essential  Active    Problem  2017    [facility],



             OP[facility] [place]



             



             

 

 SCREEN MAL  Active          [facility]



 NEOP-RECTUM            [facility]



             



             

 

 RENAL FAILURE NOS  Active          [facility]



             



             

 

 PRE-PROCEDURE LAB  Active          [facility]



             



             

 

 MEDICAL SERVICES  Active          [facility]



 NOT AVAILABLE IN            Medical



 HOME            Center



             



             

 

 PERIPHERAL  Active          [facility]



 VASCULAR DISEASE,            Medical



 UNSPECIFIED            Center



             



             

 

 ENCNTR FOR  Active          [facility]



 GENERAL ADULT            Medical



 MEDICAL EXAM W/            Center



             



             

 

 ENCOUNTER FOR  Active          [facility] [place]



 PREPROCEDURAL            Medical



 LABORATORY E            Center



             



             







Medications







 Medication  Details  Route  Status  Patient  Ordering  Order  Source



         Instructions  Provider  Date  



               



               



               

 

 Aspirin Enteric  81 mg=1    Active        [facility] [place]



 Coated 81 mg oral  tab, PO,          017  Medical



 delayed release  Daily, 0            Center



 tablet  Refill(s)            



               



               

 

 Famotidine  40 mg, PO,    Active        [facility]



   BID, # 60          017  Medical



   tab, 0            Center



   Refill(s)            



               



               

 

 famotidine 20  0 Refill(s)    Inactive        [facility] [place]



 mg/5 mL-NaCl 0.9%            017  Medical



 intravenous              Center



 solution              



               

 

 GoLYTELY oral  See    Active        [facility] [place]



 powder for  Instruction          017  Medical



 reconstitution  s, Take as            Center



   directed by            



   physician.,            



   # 1 ea, 0            



   Refill(s),            



   Pharmacy:            



   [facility]            



   Pharmacy            



   [id number]            



               



               

 

 valsartan 320 mg  320 mg=1    Active        [facility]



 oral tablet  tab, PO,          017  Medical



   Daily, # 30            Center



   tab, 0            



   Refill(s)            



               



               

 

 non-formulary  1 dose,    Active        [facility]



   Daily,          017  Medical



   ecotrin,            Center



   Refill(s) 0            



               



               

 

 24 HR Nifedipine  60 mg=1    Active         [place]



 60 MG Extended  tab, PO,          017  Medical



 Release Tablet  Daily, # 30            Center



   tab, 0            



   Refill(s)            



               



               

 

 NPH Insulin,  See Special    Active        [facility]



 Human 100 UNT/ML  Instruction          017  Medical



 Injectable  s, SUB-Q,            Center



 Suspension  BID, Inject            



 [Novolin N]   12 units            



   at 9am and            



   inject 8            



   units at            



   9pm, vial,            



   0 Refill(s)            



               



               

 

 minoxidil 2.5 mg  5 mg=2 tab,    Active        [facility]



 oral tablet  PO, BID, 0          017  Medical



   Refill(s)            Center



               



               

 

 Famotidine 20 MG  20 mg=1    Active        [facility]



 Oral Tablet  tab, PO,          017  Medical



   BID, 0            Center



   Refill(s)            



               



               

 

 labetalol 200 mg  3 tabs, PO,    Active        [facility]



 oral tablet  BID, 0          017  Medical



   Refill(s)            Center



               



               

 

 24 HR Nifedipine  60 mg=1    Active        [facility] [place]



 60 MG Extended  tab, PO,          017  Medical



 Release Tablet  BID, 0            Center



   Refill(s)            



               



               

 

 clopidogrel 75 MG  75 mg=1    Active        [facility]



 Oral Tablet  tab, PO,          017  Medical



 [Plavix]  Daily, 0            Center



   Refill(s)            



               



               

 

 tramadol  See    Active        [facility] [place]



 hydrochloride 50  Instruction          016  Medical



 MG Oral Tablet  s, 1 tab in            Center



   am and 1            



   tab in pm,            



   0 Refill(s)            



               



               

 

 NPH Insulin,  See    Active        [facility]



 Human 100 UNT/ML  Instruction          016  Medical



 Injectable  s, takes 15            Center



 Suspension  units in AM            



 [Humulin N]  and 10units            



   in PM, 0            



   Refill(s)            



               



               

 

 amLODIPine 10 mg  10 mg=1    Active        [facility]



 oral tablet  tab, PO,          016  Medical



   Daily, 0            Center



   Refill(s)            



               



               

 

 lovastatin 20 mg  20 mg=1    Active        [facility]



 oral tablet  tab, PO,          016  Medical



   Daily, 0            Center



   Refill(s)            



               



               

 

 Hydralazine  100 mg=1    Active        [facility] [place]



 Hydrochloride 100  tab, PO,          016  Medical



 MG Oral Tablet  TID, 0            Center



   Refill(s)            



               



               

 

 valsartan 320 MG  320 mg=1    Active        [facility]



 Oral Tablet  tab, PO,          016  Medical



 [Diovan]  Daily, 0            Center



   Refill(s)            



               



               

 

 labetalol 200 mg  See    Active        [facility]



 oral tablet  Instruction          015  Medical



   s, 3 tab PO            Center



   bid, 0            



   Refill(s)            



               



               

 

 gabapentin 300 MG  300 mg=1    Active         [place]



 Oral Capsule  cap, PO,          015  Medical



   Daily, # 90            Center



   cap, 0            



   Refill(s)            



               



               

 

 valsartan 320 mg  320 mg=1    Active        [facility]



 oral tablet  tab, PO,          015  Medical



   Daily, # 30            Center



   tab, 0            



   Refill(s)            



               



               

 

 calcium acetate  1,334 mg=2    Active         [place]



 667 MG Oral  cap, PO,          015  Medical



 Capsule [Phoslo]  TID, with            Center



   meals and 2            



   tabs po            



   with            



   snacks, 0            



   Refill(s)            



               



               

 

 omeprazole 20 mg  20 mg=1    Active        [facility]



 oral delayed  cap, PO,          014  Medical



 release capsule  Daily, # 30            Center



   cap, 1            



   Refill(s)            



               



               

 

 labetalol 200 mg  200 mg=1    Active        [facility]



 oral tablet  tab, PO,          014  Medical



   BID, # 60            Center



   tab, 1            



   Refill(s)            



               



               

 

 Labetalol  200 mg, 1    Inactive        [facility]



   tab, Route:          014  Medical



   PO, Drug            Center



   form: TAB,            



   ONCE,            



   Dosing            



   Weight            



   86.818, kg,            



   Start date:            



   14            



   16:23:00,            



   Stop date:            



   14            



   16:23:00Not            



   es: With            



   food. (Same            



   as:Trandate            



   ,            



   Normodyne)            



               



               

 

 Hydralazine  1 tab,    Inactive         [place]



 Hydrochloride 100  Route: PO,          014  Medical



 MG Oral Tablet  ONCE,            Center



   Dosing            



   Weight            



   86.818, kg,            



   Start date:            



   14            



   16:23:00,            



   Stop date:            



   14            



   16:23:00            



               



               

 

 Clonidine  1 tab,    Inactive        [facility] [place]



 Hydrochloride 0.3  Route: PO,          014  Medical



 MG Oral Tablet  ONCE,            Center



   Dosing            



   Weight            



   86.818, kg,            



   Start date:            



   14            



   16:22:00,            



   Stop date:            



   14            



   16:22:00            



               



               







Allergies, Adverse Reactions, Alerts







 No Known Medication Allergies







Immunizations







 No Data Provided for This Section







Results







 Order Name  Results  Value  Reference  Date  Interpretation  Comments  Source



       Range        



               



               



               

 

 HEMATOLOGY  POC  39.0  42.0 - 54.0        [facility]



   Hematocrit            [facility]



               



               



               

 

 HEMATOLOGY  POC  3.6  3.5 - 5.1        [facility]



   Potassium            [facility]



               



               



               

 

 HEMATOLOGY  POC Sodium  133  135 - 145         [place]



         /2017      [facility]



               



               



               

 

 HEMATOLOGY  POC  13.3  14.0 - 18.0        [facility] [place]



   Hemoglobin            [facility]



               



               



               

 

 HEMATOLOGY  POC Glucose  122  70 - 99         [place]



         /2017      [facility]



               



               



               

 

 REFERENCE  HLA Misc  See Report 1        Result  [facility]



 LAB RESULTS  Test  (17 9:50 AM)        Comment:  [facility]



             results  



             scanned in  



             [facility].  



             Results  



             displayed in  



             Results-Lab-R  



             EFERENCE  



             LAB-Outside  



             Lab Documents  



             (Imaged)  



             under  



             date/time  



             results were  



             scanned.  



             Report sent  



             for scanning  



             on  



             2017.  



               



               

 

 REFERENCE  Test Name  [facility]          [facility]



 LAB RESULTS    RESULTS          [facility]



               



               



               

 

 REFERENCE  HLA Misc  See Report 1        Result  [facility]



 LAB RESULTS  Test  (11/19/15 5:15 PM)        Comment:  [facility]



             results  



             scanned in  



             [facility].  



             Results  



             displayed in  



             Results-Lab-R  



             EFERENCE  



             LAB-Outside  



             Lab Documents  



             (Imaged)  



             under  



             date/time  



             results were  



             scanned.  



             Report sent  



             for scanning  



             on  



             2015.  



               



               

 

 REFERENCE  Test Name  [facility]          [facility]



 LAB RESULTS          [facility]



               



               



               

 

 CHEM PANEL  eGFR  6        <sup>1</sup>R   [place]



         /2014    esult  Medical



             Comment: The  Center



             eGFR is  



             calculated  



             using the  



             CKD-EPI  



             formula. In  



             most young,  



             healthy  



             individuals  



             the eGFR will  



             be >90  



             mL/min/1.73m2  



             . The eGFR  



             declines with  



             age. An eGFR  



             of 60-89 may  



             be normal in  



             some  



             populations,  



             particularly  



             the elderly,  



             for whom the  



             CKD-EPI  



             formula has  



             not been  



             extensively  



             validated.  



             Use of the  



             eGFR is not  



             recommended  



             in the  



             following  



             populations:&  



             lt;br/><br/>I  



             ndividuals  



             with unstable  



             creatinine  



             concentration  



             s, including  



             pregnant  



             patients and  



             those with  



             serious  



             co-morbid  



             conditions.<b  



             r/><br/>Patie  



             nts with  



             extremes in  



             muscle mass  



             or diet.  



             <br/><br/>The  



             data above  



             are obtained  



             from the  



             National  



             Kidney  



             Disease  



             Education  



             Program  



             (NKDEP) which  



             additionally  



             recommends  



             that when the  



             eGFR is used  



             in patients  



             with extremes  



             of body mass  



             index for  



             purposes of  



             drug dosing,  



             the eGFR  



             should be  



             multiplied by  



             the estimated  



             BMI.  



               

 

 CHEM PANEL  AGAP  17.3  10.0 - 20.0         [place]



         /2014      [facility]



               



               



               

 

 CHEM PANEL  Calcium Lvl  8.7  8.5 - 10.5         [place]



         /2014      [facility]



               



               



               

 

 CHEM PANEL  CO2  26  24 - 32         [place]



         /2014      [facility]



               



               



               

 

 CHEM PANEL  Chloride Lvl  103  95 - 109        [facility]



               [facility]



               



               



               

 

 CHEM PANEL  Sodium Lvl  142  135 - 145         [place]



         /2014      [facility]



               



               



               

 

 CHEM PANEL  Potassium  4.3  3.5 - 5.1        [facility]            [facility]



               



               



               

 

 CHEM PANEL  Glucose Lvl  162  70 - 99      <sup>2</sup>I   [place]



         /2014    nterpretive  Medical



             Data: Adult  Center



             reference  



             range values  



             reflect the  



             clinical  



             guidelines<br  



             />of the  



             American  



             Diabetes  



             Association.  



               



               

 

 CHEM PANEL  Creatinine  8.9  0.5 - 1.4        [facility]      /2014      [facility]



               



               



               

 

 CHEM PANEL  BUN  51  7 - 22         [place]



         /2014      [facility]



               



               



               

 

 CHEMISTRY  POC  5.4  3.5 - 5.1    HI    [facility]



   Potassium      /      [facility]



               



               



               

 

 BEDSIDE  Comment1  Notify      NA    [facility]



 GLUCOSE    RN/MD    /      Medical



 TESTING              [place]



               



               



               

 

 BEDSIDE  Gluc POC  193  70 - 99    [place]  <sup>1</sup>I  [facility]



 GLUCOSE  [employer]      /    nterpretive  Medical



 TESTING            Data:  Center



               



             Upper  



             Reportable  



             Limit: 200  



             mg/dL.  



               



               







Pathology Reports







 No Data Provided for This Section







Diagnostic Reports







 Report  Value  Date  Source



       

 

 Chest wo contrast CT  EXAM: CT CHEST WITHOUT CONTRAST  2017  [facility] 
Medical



   DATE: 2017    Center



   INDICATION: ESRD; PRE-TRANSPLANT WORK-UP    



   TECHNIQUE: Volumetric CT acquisition of the chest without contrast. Axial, 
sagittal and coronal reconstructions. Axial MIP images were reformatted at the 
scanner workstation.    



   IV contrast: None.    



   DLP: 812 mGy-cm    



   COMPARISON: Chest CT dated 2015    



   DISCUSSION:    



   Lines and Tubes: None.    



   Heart and Great Vessels: There is atherosclerotic calcification of the 
thoracic aorta right and left coronary arteries including the left anterior 
descending and left circumflex coronary arteries. Cardi    



   othoracic ratio measures 12.5/29 cm. There is no pleural or pericardial 
effusion.    



   Lymph Nodes: No hilar, mediastinal, axillary or internal mammary 
lymphadenopathy.    



   Lungs:  4 mm left upper lobe nodule on axial image 37, 3 mm right middle 
lobe nodule on axial image 108, 3 mm right lower lobe nodule on axial image 120 
and 2 mm right lower lobe nodule on axial image 1    



   35. These nodules are all stable since 2015. No new pulmonary nodules 
or masses.    



   Trachea and central bronchi are unremarkable.    



   Upper abdomen:  For comments below the diaphragm, I refer you to the abdomen 
and pelvic CT report from 2017.    



   Bones and Soft Tissues: Degenerative changes of the thoracic spine are noted 
with osteophytes.    



   IMPRESSION:    



   1.  4 mm left upper lobe nodule on axial image 37, 3 mm right middle lobe 
nodule on axial image 108, 3 mm right lower lobe nodule on axial image 120 and 
2 mm right lower lobe nodule on axial image 135.    



   These nodules are all stable since 2015. No new pulmonary nodules or 
masses.    



   2. Atherosclerotic calcification of the thoracic aorta right and left 
coronary arteries including the left anterior descending and left circumflex 
coronary arteries.    



       

 

 Abdomen/Pelvis wo IV  EXAM:  CT ABDOMEN AND PELVIS WITHOUT CONTRAST  2017   [facility] Britt



 contrast CT  DATE: 2017 1:19 PM CST    



       



   INDICATION: Z01.818   Encounter for other preprocedural examination. Renal 
transplant evaluation.    



       



   ADDITIONAL INFORMATION: None.    



   COMPARISON: CT abdomen pelvis 2014 and renal ultrasound 2015    



   TECHNIQUE: Volumetric CT acquisition of the abdomen and pelvis without the 
intravenous administration contrast. Axial, coronal and sagittal 
reconstructions.    



       



   IV CONTRAST: None    



             ORAL CONTRAST: Water    



             RADIATION DOSE: Total DLP: 1172 mGy*cm    



                       Estimated effective dose: DLP x 0.015 mSv    



            COMPLICATIONS: None    



   FINDINGS:    



   Lines and tubes: None.    



   Lower thorax: Lung bases are clear. No pleural effusions. The heart is 
within normal limits in size. Coronary artery calcifications.    



   Liver: Mild pneumobilia is suggestion of a Riedel's lobe. A too small to 
characterize hypodensity within the right hepatic lobe.    



   Biliary tree: No intra- or extrahepatic biliary ductal dilation.    



   Gallbladder: Subtle hyperdensity may suggest tiny stones/sludge. No wall 
thickening or pericholecystic fluid.    



   Pancreas: Atrophic pancreas without ductal dilatation. No peripancreatic 
stranding.    



   Spleen: No splenomegaly.    



   Adrenals: No nodules.    



   Kidneys and ureters: Bilateral atrophic kidneys with prominent stranding but 
without fluid collections. No renal stones or hydronephrosis. No obvious cystic 
lesions.    



   No hydroureter.    



   Bladder: Bladder collapsed without radiopaque stones. Prominent prostate.    



   Gastrointestinal tract: Small hiatal hernia. The stomach is partially 
underdistended limiting optimal evaluation. No small or large bowel 
obstruction. Visualized appendix is within normal limits in size    



    without right lower quadrant fluid collections stranding. Moderate stool 
burden.    



   Peritoneum and retroperitoneum: No organized fluid collections or free air. 
Mild mesenteric stranding, not significant changed from prior exam.    



       



   Lymph nodes: Prominent periportal lymph node unchanged. No other 
lymphadenopathy.    



   Vasculature: IVC and abdominal aorta within emilio limits in caliber. 
Moderate vascular calcifications of the abdominal aorta and visceral branches. 
   



   Iliac vessels: Scattered vascular calcifications of the bilateral common 
iliac arteries. Scattered vascular calcifications of the bilateral distal 
external iliac arteries. Prominent calcifications of th    



   e bilateral internal iliac arteries. The iliac vessels are within normal 
limits in size without aneurysmal dilatation.    



   Bones: Multilevel degenerative changes of the thoracolumbar and lumbosacral 
spine with vacuum phenomena on the lumbosacral spine. Facet arthrosis.    



   Soft tissues: Small bilateral fat-containing inguinal hernias. Small fat-
containing umbilical hernia with diastasis of the rectus abdominis muscle. 
Nonspecific soft tissue stranding and edema.    



   IMPRESSION:    



   1.  Bilateral kidneys with persistent moderate perinephric stranding likely 
related to chronic renal disease.    



   2.  Persistent prominent periportal lymph node. No other lymphadenopathy.    



   3.  Bilateral iliac arteries and branches as detailed. No aneurysmal 
dilatation.    



   4.  Hepatomegaly.    



   5.  No bowel obstruction. Moderate stool burden.    



       

 

 HVI VAS Arterial Upper or  Reason for exam: Peripheral arterial disease  2016  [facility]



   IMPRESSION:    



       



   1. On the right the ankle/brachial index is 0.94.    



   2. On the left the ankle/brachial index is nonobtainable.    



       



   COMMENT:    



   Bilateral lower extremities segmental pressures with velocity waveform 
analysis was performed.    



       



   On the right, primarily triphasic waveforms are noted in the posterior tibial
, and dorsalis pedis artery.  The right toe brachial index is 0.69.    



       



   On the left, primarily triphasic waveforms are noted in the posterior tibial
, and dorsalis pedis artery.  The left toe brachial index is 0.68.    



       

 

 Retroperitoneal Complete  EXAM: RENAL ULTRASOUND.  2015  [facility]



   DATE: 2015 at 1449 hours.    



       



   INDICATION: End-stage renal disease, pretransplant workup.    



       



   COMPARISON: CT abdomen pelvis 2014.    



       



   TECHNIQUE: Multiplanar sonographic imaging of the retroperitoneal structures 
was performed with Doppler and submitted for interpretation.    



       



   DISCUSSION:    



       



   The visualized portions of the liver appear normal.    



       



   The right kidney measures 9.4 x 4.6 x 5 cm and the left kidney measures 9.2 
x 4.9 x 4.4 cm. The kidneys are small and echogenic and demonstrate areas of 
cortical thinning. No cysts, masses, or calcifica    



   tions are seen. No hydronephrosis or hydroureter is present.    



       



   The bladder is collapsed and not evaluated.    



       



   IMPRESSION:    



   Bilateral atrophic kidneys without masses or acute abnormality.    



       

 

 Chest wo contrast CT  CT CHEST WITHOUT CONTRAST 2015 10:57:00  2015  [facility]



   COMPARISON: Chest x-ray from 3/11/2014    



       



   CLINICAL INDICATION: pre transplant workup    



       



   TECHNIQUE: The chest CT was performed without intravenous contrast. The 
chest was scanned from apices to bases.  Reformatted sagittal and coronal 
images were obtained and reviewed.    



       



   FINDINGS:    



       



   MEDIASTINUM/ANNMARIE/VESSELS: The heart size is normal and there is no 
pericardial effusion. There are three-vessel coronary artery calcifications. 
Thoracic aortic calcifications are also present. Scattered    



    subcentimeter mediastinal lymph nodes, majority of which demonstrate a 
fatty hilum, benign finding. Lack of intravenous contrast limits evaluation for 
hilar lymphadenopathy.    



       



   LUNGS/PLEURA: Mucoid secretions are seen in the trachea and left mainstem 
bronchus. 2 mm nodule in the right lower lobe on axial image 99. 3 mm nodule in 
the right lower lobe on axial image 90 There is    



   no pathologic pulmonary nodule or mass identified. A few scattered calcified 
granulomas are present. No pleural effusion or pneumothorax.    



       



   BONES/SOFT TISSUES: Degenerative changes of the thoracic spine. Bilateral 
gynecomastia.    



       



   UPPER ABDOMEN: Limited evaluation without acute focal abnormality.    



       



   CONCLUSION:    



   1. No acute intrathoracic abnormality.    



   2. Few scattered noncalcified less than 4 mm pulmonary nodules do not 
require future followup unless the patient has clinical risk factors such as 
smoking history. Scattered bilateral calcified granulomas.    



   3. Three-vessel coronary artery calcifications.    



       

 

 Abdomen/Pelvis wo IV  INDICATION: Abdominal pain. Chronic renal insufficiency.
  2014  [facility] CT      Center



   PROCEDURE:  CT of the abdomen and pelvis was acquired with a multidetector 
scanner.  No I.V. or enteric contrast was present for the exam.  Axial, 
sagittal and coronal images were evaluated..    



       



   FINDINGS: The liver is slightly prominent in size measuring 19.1 cm in span. 
The right lobe is rather elongated suggesting a Riedel's lobe. No focal masses 
is present in the liver, and the density is normal.    



       



   The gallbladder has normal wall thickness with no stone or sludge.    



       



   The pancreas has no inflammatory change, ductal dilatation or focal mass.    



       



   The spleen is normal.    



       



   There are no adrenal masses.    



       



   The kidneys are normal in span bilaterally with no appreciable cortical 
thickness. There is mild perinephric stranding bilaterally. No focal mass is 
present in either kidney, and there are no calcificat    



   ions. There is no hydronephrosis. The ureters are unremarkable. The urinary 
bladder was only minimally distended. The walls are grossly normal.    



       



   The stomach, small bowel, appendix and colon are unremarkable except for 
scattered colonic diverticula without diverticulitis.    



       



   The aorta has moderately heavy calcifications. These extend into the iliac 
arteries and common femoral arteries.    



       



   The lung bases are clear.    



       



   There are mild degenerative changes in the spine and no suspicious lytic or 
blastic lesions are present.    



       



   IMPRESSION:    



   1. The increase in the liver is likely a normal variant. The punctate 
calcification likely represent a calcified granuloma.    



   2. The perinephric stranding bilaterally is possibly related renal 
insufficiency. Correlate for any active inflammatory or infectious process.    



   3. Mildly prominent or scarring changes are present in the aorta and some 
mid sized arteries.    



       

 

 Abdomen complete US  EXAM: US ABDOMEN COMPLETE  2014  [facility]



   DATE: 2014 at 1138.    



       



   INDICATION: Pretransplant workup.    



       



   ADDITIONAL INFORMATION: End-stage renal disease.    



       



   COMPARISON: CT abdomen pelvis without contrast 2013.    



       



   TECHNIQUE: Multiplanar grayscale and color Doppler ultrasound images of the 
abdomen were obtained.    



       



   FINDINGS:    



       



   Liver demonstrates heterogenous echogenicity without masses. Right hepatic 
lobe measures 16.1 cm at the midclavicular line. Main portal vein is 1.2 cm 
with hepatopetal flow.    



       



   Gallbladder normal without gallstones. Gallbladder wall thickness is 2.5 mm. 
No sonographic Merchant&[employer];apos;s sign or pericholecystic fluid.    



       



   Visualized portions of the intrahepatic biliary tree are of normal caliber. 
Common duct is 4.8 mm.    



       



   Spleen measures 10.4 x 4.1 x 4.5 cm.    



       



   Pancreas is unremarkable where visualized.    



       



   The kidneys are echogenic, compatible with medical renal disease. No masses 
or hydronephrosis. Right kidney measures 10.4 x 3.9 x 4.9 cm. Left kidney 
measures 10.8 x 4.9 x 4.2 cm.    



       



   Abdominal aorta is of normal caliber.  Inferior vena cava unremarkable where 
visualized.    



       



   No significant free fluid identified.    



       



   IMPRESSION:    



       



   1. Heterogenous liver without focal lesions.    



   2. Mildly echogenic kidneys compatible with medical renal disease.    



       

 

 Chest 2 views  EXAM: XR CHEST 2 VIEWS  2014  [facility]



   DATE: 3/11/2014 at 1249 hours    



       



   INDICATION: ESRD; PRE-TRANSPLANT WORK-UP    



       



   COMPARISON: 2013 at 0916 hours    



       



   TECHNIQUE: Frontal and lateral chest radiographs    



       



   DISCUSSION: There has been interval removal of the right-sided internal 
jugular dialysis catheter. The cardiomediastinal silhouette is normal. The 
costophrenic angles are sharp no pneumothorax identifie    



   d. The lungs are clear. The bones and soft tissues are unremarkable.    



       



   IMPRESSION:  Normal chest radiograph.    



       



       







Consultation Notes







 No Data Provided for This Section







Discharge Summaries







 No Data Provided for This Section







History and Physicals







 No Data Provided for This Section







Vital Signs







 Vital Sign  Value  Date  Comments  Source



         

 

 Temperature Oral (F)  97.4 F  2017    [facility]



         

 

 BMI Calculated  30.97  2017    [facility]



         

 

 Weight  92.386  2017    [facility]



         

 

 Height  172.72 cm  2017    [facility]



         

 

 Respitory Rate  20  2017    [facility]



         

 

 Heart Rate  64  2017    [facility]



         

 

 Systolic (mm Hg)  146  2017    [facility]



         

 

 Diastolic (mm Hg)  66  2017    [facility]



         

 

 Height  172.72 cm  2017    [facility]



         

 

 BMI Calculated  31.24  2017    [facility]



         

 

 Weight  93.182  2017    [facility]



         

 

 Heart Rate  65  2017    [facility]



         

 

 Systolic (mm Hg)  179  2017    [facility]



         

 

 Diastolic (mm Hg)  73  2017    [facility]



         

 

 Height  172.72 cm  2017    [facility]



         

 

 Weight  93.182  2017    [facility]



         

 

 BMI Calculated  31.24  2017    [facility]



         Center



         

 

 Respitory Rate  18  2017    [facility]



         

 

 Heart Rate  66  2017    [facility]



         

 

 Weight  92.9  2017    [facility]



         

 

 Height  173 cm  2017    [facility]



         

 

 Systolic (mm Hg)  95  2017    [facility]



         Center



         

 

 Diastolic (mm Hg)  58  2017    [facility]



         

 

 BMI Calculated  31.04  2017    [facility]



         

 

 Height  172.72 cm  2016    [facility]



         

 

 BMI Calculated  31.39  2016    [facility]



         

 

 Weight  93.636  2016    [facility]



         

 

 Weight  93.892  2016    [facility]



         

 

 BMI Calculated  31.47  2016    [facility]



         

 

 Height  172.72 cm  2016    [facility]



         

 

 Temperature Oral (F)  97.2 F  2016    [facility]



         

 

 Respitory Rate  18  2016    [facility]



         

 

 Heart Rate  74  2016    [facility]



         

 

 Weight  95  2015    [facility]



         

 

 BMI Calculated  31.84  2015    [facility]



         

 

 Height  172.72 cm  2015    [facility]



         

 

 Weight  96.1  2015    [facility]



         

 

 BMI Calculated  31.38  2015    [facility]



         

 

 Systolic (mm Hg)  123  2015    [facility]



         Center



         

 

 Diastolic (mm Hg)  66  2015    [facility]



         

 

 Heart Rate  63  2015    [facility]



         Center



         

 

 Respitory Rate  19  2015    [facility]



         

 

 Height  175 cm  2015    [facility]



         

 

 Temperature Oral (F)  98.1 F  2014    [facility]



         Center



         

 

 Systolic (mm Hg)  150  2014    [facility]



         Center



         

 

 Diastolic (mm Hg)  69  2014    [facility]



         Center



         

 

 Respitory Rate  15  2014    [facility]



         

 

 Systolic (mm Hg)  186  2014    [facility]



         Center



         

 

 Diastolic (mm Hg)  83  2014    [facility]



         

 

 Respitory Rate  12  2014    [facility]



         

 

 BMI Calculated  29.1  2014    [facility]



         

 

 Weight  86.818  2014    [facility]



         

 

 Height  172.72 cm  2014    [facility]



         

 

 Temperature Oral (F)  97.9 F  2014    [facility]



         

 

 Systolic (mm Hg)  194  2014    [facility]



         

 

 Respitory Rate  16  2014    [facility]



         

 

 Heart Rate  77  2014    [facility]



         

 

 Diastolic (mm Hg)  80  2014    [facility]



         

 

 BMI Calculated  29.71  2014    [facility]



         

 

 Weight  87.9  2014    [facility]



         

 

 Respitory Rate  20  2014    [facility]



         

 

 Systolic (mm Hg)  99  2014    [facility]



         

 

 Heart Rate  73  2014    [facility]



         

 

 Diastolic (mm Hg)  53  2014    [facility]



         

 

 Temperature Oral (F)  97.0 F  2014    [facility]



         

 

 Height  172 cm  2014    [facility]



         







Encounters







 Location  Location  Encounter  Encounter  Reason  Attending  ADM  DC  Status  
Source



   Details  Type  Number  For  Provider  Date  Date    



         Visit          



                   



                   



                   

 

 [facility]    TB  [id number]  PRE  NHI      Active  [facility]      0  TRANSPLA  ADROGUE        [facility]        NT EVAL          Center



                   



                   



                   

 

 [facility]    Outpatient  [id number]  KIDNEY  HILDA    Active  [facility]



 Medical      0  [place]/CARDI  LOYALKA  /2013    [facility]        AC          Center



         CLEARENC          



         E PER          



         JOSESITO          



         GRAVES          



                   



                   



                   

 

 [facility] [place]    RADHA  [id number]  BDDC-REC  ATILLA    Active  [facility]      3  TALON  ERTAN  /2013    [facility]        CANCER          Center



         SCREENIN          



         G          



                   



                   

 

 [facility]    Outpatient  [id number]  LAB  ANNAMARIA CURITS    Active  [facility]



 Medical          [facility]    Outpatient  [id number]  LAB  ANNAMARIA CURTIS    Active  [facility]



 Medical      3      /2013  /2013    [facility]    Outpatient  [id number]  LAB  ANNAMARIA CURTIS  10/02    Active  [facility]            [facility]    Outpatient  [id number]  LAB  ANNAMARIA CURTIS      Active  [facility]            [facility]    Outpatient  [id number]  LAB  ANNAMARIA CURTIS      Active  [facility]            [facility]    Outpatient  [id number]  LAB  ANNAMARIA CURTIS      Active  [facility]            [facility]    OP  [phone]    Nhi  03/11  04/10     [place]



 [place]    Transplant  1    Adrogue  /2014    Medical



 Hospital    Clinic -              Center



     Pre              



                   



                   

 

 [facility]    EC  [id number]    Cassidy       [place]



 [place]    Emergency  5    Kal  /2014    [facility]    OP  [phone]    Nhi       [place]



 [place]    Transplant  2    Adrogue  /2014    [facility]



 Hospital    Clinic -              Center



     Pre              



                   



                   

 

 [facility]    OP  [id number]    Nhi       [place]



 [place]    Recurring  3    Adrogue  /2014    [facility]    OP  [phone]    Nhi       [place]



 [place]    Transplant  4    Adrogue  /2014    [facility]    Clinic -              Center



     Pre              



                   



                   

 

 [facility]    OP  [phone]    Non       [place]



 [place]    Transplant  5    Physician  /2015    Medical



 Transplant    Clinic -              Center



 Ctr    Pre              



                   



                   

 

 [facility]    OP  [phone]    Clair       [place]



 [place]    Transplant  6        [facility] -              Center



     Pre              



                   



                   

 

 [facility]    Outpatient  [id number]    Barrington       [place]



 [place]      8    Torres      [facility]



 Advanced                  



 Heart                  



 Failure                  



                   



                   

 

 [facility]    Outpatient  [id number]    Barrington  01/14  01/15     [place]



 [place]      1    Torres  /2016    [facility]    OP  [phone]    Giulia       [place]



 [place]    Transplant  7    [facility] [place]    Medical



 Transplant    Clinic -              Center



 Ctr    Pre              



                   



                   

 

 [facility]    Outpt Diag  [id number]    Clair  02/14  02/15     [facility]



 Outpatient    Services  0        [place]



 Imaging                  



 [facility]                  



                   



                   

 

 [facility]    Outpatient  [id number]    Soren Cordell  02/14  02/15     [place]



 [place]      3      /2017  /2017    [facility]



                   



                   



                   

 

 [facility]    Bedded  [id number]    Soren Cordell       [place]



 [place]    Outpatient      [facility]    Recurring  [id number]    Clair       [place]



 [place]      8        [facility]



 Advanced                  



 Heart                  



 Failure                  



                   



                   

 

 [facility]    Outpatient  [id number]  COLONSCO  ANNAMARIA CURTIS      Active  [address]        [facility]



         E          



                   



                   

 

 [facility] [place]    Preadmit  [id number]  RENAL/DO  ANNAMARIA CURTIS      Active  [place] [address]  NOT USE          [facility]        THIS          Center



         ACCT FOR          



         CHARGES          



         F/C          



         NOTES          



         ONLY          



                   



                   



                   







Procedures







 Procedure  Code  Date  Perfomer  Comments  Source



           



           

 

 Colonoscopy<sup>1</rodrigues  [id number]  2013    Repeat in 3  [facility],



           [facility]



           



           

 

 Cardiac  [id number]  2013      [facility]



 catheterization          [facility] [facility],



           [facility] [place]



           



           

 

 Hemodialysis  [id number]  2012      [facility],



           [place]



           



           

 

 Creation of  [id number]        [facility]



 arteriovenous shunt          Medical



 or fistula for          [place],



 dialysis by external          [facility] [place]



 cannula          



           

 

 Chronic peritoneal  [id number]        [facility]



 dialysis          [facility],



           [facility] [place]



           



           







Assessment and Plan







 No Data Provided for This Section







Plan of Care







 No Data Provided for This Section







Social History







 Social History  Date  Source



     

 

 Social History TypeResponse  2017  [facility]



 Alcohol    



 Past    



 Smoking Status    



 Former smoker; Type: Cigarettes; Concerns about tobacco use in household: No; 
Exposure to Tobacco Smoke None; Cigarette Smoking Last 365 Days No; Reg Smoking 
Cessation Counseling No1    



 1Stopped more than 40 years ago    

 

 Social History TypeResponse  2017   [facility] [place]



 Alcohol    



 Past    



 Smoking Status    



 Never smoker; Exposure to Tobacco Smoke None; Cigarette Smoking Last 365 Days 
No; Reg Smoking Cessation Counseling No    



     







Family History







 No Data Provided for This Section







Advance Directives







 No Data Provided for This Section







Functional Status







 No Data Provided for This Section

## 2019-08-20 NOTE — XMS REPORT
Patient Summary Document

 Created on:2019



Patient:HOMER SOARES

Sex:Male

:1955

External Reference #:[id number]





Demographics







 Address  [address] [place] [address]

 

 Home Phone  [phone]

 

 Email Address  NONE

 

 Preferred Language  Unknown

 

 Marital Status  Unknown

 

 Amish Affiliation  Unknown

 

 Race  Unknown

 

 Additional Race(s)  Unavailable

 

 Ethnic Group  Unknown









Author







 Organization  [facility]

 

 Address  [address]



   [place] [address]

 

 Phone  [phone]









Care Team Providers







 Name  Role  Phone

 

 Unavailable  Unavailable  Unavailable









Problems

This patient has no known problems.



Allergies, Adverse Reactions, Alerts

This patient has no known allergies or adverse reactions.



Medications

This patient has no known medications.

## 2019-08-21 LAB
FERRITIN SERPL-MCNC: 3775.6 NG/ML (ref 26–388)
HCT VFR BLD CALC: 15.1 % (ref 39.6–49)
HCT VFR BLD CALC: 27.7 % (ref 39.6–49)
INR BLD: 1.39
IRON SERPL-MCNC: 117 UG/DL (ref 65–175)
TRANSFERRIN SERPL-MCNC: 148 MG/DL (ref 200–360)

## 2019-08-21 PROCEDURE — 0DB68ZX EXCISION OF STOMACH, VIA NATURAL OR ARTIFICIAL OPENING ENDOSCOPIC, DIAGNOSTIC: ICD-10-PCS

## 2019-08-21 PROCEDURE — 0W3P8ZZ CONTROL BLEEDING IN GASTROINTESTINAL TRACT, VIA NATURAL OR ARTIFICIAL OPENING ENDOSCOPIC: ICD-10-PCS

## 2019-08-21 PROCEDURE — 0DB98ZX EXCISION OF DUODENUM, VIA NATURAL OR ARTIFICIAL OPENING ENDOSCOPIC, DIAGNOSTIC: ICD-10-PCS

## 2019-08-21 PROCEDURE — 30233N1 TRANSFUSION OF NONAUTOLOGOUS RED BLOOD CELLS INTO PERIPHERAL VEIN, PERCUTANEOUS APPROACH: ICD-10-PCS

## 2019-08-21 RX ADMIN — ATORVASTATIN CALCIUM SCH: 10 TABLET, FILM COATED ORAL at 21:00

## 2019-08-21 RX ADMIN — Medication SCH DROP: at 21:00

## 2019-08-21 RX ADMIN — HUMAN INSULIN SCH: 100 INJECTION, SOLUTION SUBCUTANEOUS at 06:00

## 2019-08-21 RX ADMIN — Medication SCH DROP: at 09:44

## 2019-08-21 RX ADMIN — Medication SCH ML: at 08:47

## 2019-08-21 RX ADMIN — Medication SCH ML: at 21:42

## 2019-08-21 RX ADMIN — HUMAN INSULIN SCH: 100 INJECTION, SOLUTION SUBCUTANEOUS at 17:31

## 2019-08-21 RX ADMIN — SODIUM CHLORIDE SCH MLS: 0.9 INJECTION, SOLUTION INTRAVENOUS at 11:07

## 2019-08-21 RX ADMIN — EPOETIN ALFA-EPBX SCH UNIT: 10000 INJECTION, SOLUTION INTRAVENOUS; SUBCUTANEOUS at 17:16

## 2019-08-21 RX ADMIN — Medication SCH: at 21:00

## 2019-08-21 RX ADMIN — HUMAN INSULIN SCH: 100 INJECTION, SOLUTION SUBCUTANEOUS at 12:00

## 2019-08-21 RX ADMIN — HYDRALAZINE HYDROCHLORIDE PRN MG: 20 INJECTION INTRAMUSCULAR; INTRAVENOUS at 21:40

## 2019-08-21 NOTE — EDPHYS
Physician Documentation                                                                           

 [facility]                                                                 

Name: Kyle Chan                                                                                  

Age: 64 yrs                                                                                       

Sex: Male                                                                                         

: 1955                                                                                   

MRN: [id number]                                                                                   

Arrival Date: 2019                                                                          

Time: 21:46                                                                                       

Account#: [id number]                                                                            

Bed 4                                                                                             

Private MD: Dustin Baugh R ED Physician Ian Wilkins                                                                      

HPI:                                                                                              

                                                                                             

22:35 This 64 yrs old  Male presents to ER via Wheelchair with complaints of          pm1 

      Generalized weakness, Leg Swelling.                                                         

                                                                                                  

Historical:                                                                                       

- Allergies:                                                                                      

22:17 No Known Allergies;                                                                     ak1 

- Home Meds:                                                                                      

22:17 gabapentin 100 mg oral cap 3 caps 3 times per day [Active];                             ak1 

- PMHx:                                                                                           

22:17 Diabetes - IDDM; Dialysis; Hyperlipidemia; Hypertension;                                ak1 

- PSHx:                                                                                           

22:17 left foot; right leg sx 19;                                                        ak1 

                                                                                                  

- Immunization history:: Adult Immunizations unknown.                                             

- Social history:: Smoking status: Patient/guardian denies using tobacco.                         

- Ebola Screening: : No symptoms or risks identified at this time.                                

                                                                                                  

                                                                                                  

ROS:                                                                                              

22:36 Eyes: Negative for injury, pain, redness, and discharge, ENT: Negative for injury,      pm1 

      pain, and discharge, Neck: Negative for injury, pain, and swelling, Respiratory:            

      Negative for shortness of breath, cough, wheezing, and pleuritic chest pain,                

      Abdomen/GI: Negative for abdominal pain, nausea, vomiting, diarrhea, and constipation,      

      Back: Negative for injury and pain.                                                         

22:36 MS/Extremity: Negative for injury and deformity, Skin: Negative for injury, rash, and       

      discoloration, Neuro: Negative for headache, weakness, numbness, tingling, and seizure.     

22:36 Constitutional: Positive for malaise, Negative for body aches, fever.                       

22:36 Cardiovascular: Positive for swelling right leg, Negative for chest pain, palpitations.     

23:30 Abdomen/GI: Positive for black/tarry stool.                                             pm1 

                                                                                                  

Exam:                                                                                             

22:36 Head/Face:  Normocephalic, atraumatic. Eyes:  Pupils equal round and reactive to light, pm1 

      extra-ocular motions intact.  Lids and lashes normal.  Conjunctiva and sclera are           

      non-icteric and not injected.  Cornea within normal limits.  Periorbital areas with no      

      swelling, redness, or edema. ENT:  Nares patent. No nasal discharge, no septal              

      abnormalities noted.  Tympanic membranes are normal and external auditory canals are        

      clear.  Oropharynx with no redness, swelling, or masses, exudates, or evidence of           

      obstruction, uvula midline.  Mucous membranes moist. Neck:  Trachea midline, no             

      thyromegaly or masses palpated, and no cervical lymphadenopathy.  Supple, full range of     

      motion without nuchal rigidity, or vertebral point tenderness.  No Meningismus.             

      Chest/axilla:  Normal chest wall appearance and motion.  Nontender with no deformity.       

      No lesions are appreciated. Cardiovascular:  Regular rate and rhythm with a normal S1       

      and S2.  No gallops, murmurs, or rubs.  Normal PMI, no JVD.  No pulse deficits.             

      Respiratory:  Lungs have equal breath sounds bilaterally, clear to auscultation and         

      percussion.  No rales, rhonchi or wheezes noted.  No increased work of breathing, no        

      retractions or nasal flaring. Abdomen/GI:  Soft, non-tender, with normal bowel sounds.      

      No distension or tympany.  No guarding or rebound.  No evidence of tenderness               

      throughout. Back:  No spinal tenderness.  No costovertebral tenderness.  Full range of      

      motion.                                                                                     

22:36 Skin:  Warm, dry with normal turgor.  Normal color with no rashes, no lesions, and no       

      evidence of cellulitis.                                                                     

22:36 Constitutional: The patient appears in no acute distress, alert, awake, non-toxic, well     

      developed, well hydrated, well groomed, well nourished.                                     

22:36 Musculoskeletal/extremity: Extremities: grossly normal except: DVT Exam: no pain, no        

      tenderness, no appreciated bluish discoloration, no erythema, no increased warmth,          

      swelling, of the right leg.                                                                 

22:36 Neuro: Orientation: is normal, to person, place, time, situation, Mentation: is normal,     

      Motor: moves all fours, Sensation: is normal, no obvious gross deficits.                    

23:36 Abdomen/GI: Rectal exam: rectal tone normal, Stool: guaiac positive, black, mass, is    pm1 

      not appreciated, tenderness, is not appreciated.                                            

                                                                                                  

Vital Signs:                                                                                      

22:13  / 39; Pulse 76; Resp 16; Temp 98.1; Pulse Ox 100% on R/A; Weight 82.55 kg (R);   ak1 

      Height 5 ft. 8 in. (172.72 cm) (R); Pain 0/10;                                              

23:38  / 30; Pulse 57; Resp 18 S; Temp 98.3(O); Pulse Ox 100% on Nebulizer Mask;        bb  

                                                                                             

01:22  / 50; Pulse 55; Resp 16; Temp 97.9(O); Pulse Ox 100% on R/A;                     lp1 

01:57  / 57; Pulse 59; Resp 16 S; Temp 98.1(TE); Pulse Ox 100% on R/A;                  bb  

                                                                                             

22:13 Body Mass Index 27.67 (82.55 kg, 172.72 cm)                                             ak1 

                                                                                                  

MDM:                                                                                              

                                                                                             

22:11 Patient medically screened.                                                             [facility] 

                                                                                             

00:06 Physician consultation: Chester Pavon MD was called at 23:46, was contacted at 00:06,  pm1 

      regarding consult, patient's condition, and will see patient tomorrow, Transfuse 1 unit     

      of PRBC now and have 2 units of PRBC ready for transfusion tomorrow with dialysis .         

00:16 Data reviewed: vital signs. Data interpreted: Pulse oximetry: on room air is 100 %.     pm1 

      Interpretation: normal. Counseling: I had a detailed discussion with the patient and/or     

      guardian regarding: the historical points, exam findings, and any diagnostic results        

      supporting the discharge/admit diagnosis, lab results, radiology results, the need for      

      further work-up and treatment in the hospital.                                              

00:19 Physician consultation: Jared Oliveira MD was called at 00:19, was contacted at 00:19,  pm1 

      regarding consult, patient's condition, and will see patient tomorrow.                      

00:40 Physician consultation: Hosptialmeena Sarabia was called at 00:41, was contacted at 00:41, pm1 

      regarding admission, patient's condition, and will see patient in ED, shortly.              

                                                                                                  

                                                                                             

22:20 Order name: CBC with Diff                                                               pm1 

                                                                                             

22:20 Order name: CMP; Complete Time: 23:25                                                   pm1 

                                                                                             

22:21 Order name: CBC with Automated Diff; Complete Time: 23:54                               EDMS

                                                                                             

23:26 Order name: Type And Screen                                                             pm1 

                                                                                             

23:40 Order name: Troponin (emerg Dept Use Only); Complete Time: 00:15                        pm1 

                                                                                             

23:40 Order name: PT-INR                                                                      pm1 

                                                                                             

22:20 Order name: Extremity Venous Uni Ltd US                                                 pm1 

                                                                                             

23:40 Order name: XRAY Chest (1 view)                                                         pm1 

                                                                                             

23:42 Order name: CBC Smear Scan; Complete Time: 23:54                                        EDMS

                                                                                             

00:39 Order name: Packed RBC Leukored                                                         EDMS

                                                                                             

22:20 Order name: IV Saline Lock; Complete Time: 22:49                                        pm1 

                                                                                             

23:40 Order name: Transfuse; Complete Time: 02:04                                             pm1 

                                                                                             

23:40 Order name: EKG; Complete Time: 23:42                                                   pm1 

                                                                                             

01:45 Order name: CONS Pharmacy Consult                                                       EDMS

                                                                                             

01:45 Order name: NPO                                                                         EDMS

                                                                                             

01:45 Order name: EKG Electrocardiogram                                                       EDMS

                                                                                             

01:46 Order name: EKG Electrocardiogram                                                       EDMS

                                                                                             

01:46 Order name: EKG Electrocardiogram                                                       EDMS

                                                                                             

01:46 Order name: EKG Electrocardiogram                                                       EDMS

                                                                                             

01:46 Order name: EKG Electrocardiogram                                                       EDMS

                                                                                             

01:46 Order name: EKG Electrocardiogram                                                       EDMS

                                                                                             

01:46 Order name: EKG Electrocardiogram                                                       EDMS

                                                                                             

01:46 Order name: EKG Electrocardiogram                                                       EDMS

                                                                                             

01:47 Order name: EKG Electrocardiogram                                                       EDMS

                                                                                             

01:47 Order name: EKG Electrocardiogram                                                       EDMS

                                                                                             

01:47 Order name: EKG Electrocardiogram                                                       EDMS

                                                                                             

23:40 Order name: Cardiac monitoring; Complete Time: 23:41                                    pm1 

                                                                                             

23:40 Order name: EKG - Nurse/Tech; Complete Time: 23:49                                      pm1 

                                                                                             

23:40 Order name: Labs collected and sent; Complete Time: 23:41                               pm1 

                                                                                             

23:40 Order name: O2 Per Protocol; Complete Time: 23:41                                       pm1 

                                                                                             

23:40 Order name: O2 Sat Monitoring; Complete Time: 23:41                                     pm1 

                                                                                                  

Administered Medications:                                                                         

00:15 Drug: ProTONIX 80 mg Route: IVP; Site: right forearm;                                   bb  

01:15 Follow up: Response: No adverse reaction                                                bb  

00:15 Drug: ProTONIX 8 mg/hr Route: IV; Rate: 25 ml/hr; Site: right forearm;                  bb  

02:55 Follow up: IV Status: Infusion continued upon admission                                 bb  

01:32 Drug: Tylenol 650 mg Route: PO;                                                         bb  

02:07 Follow up: Response: No adverse reaction                                                bb  

01:32 Drug: Benadryl 12.5 mg Route: IVP; Site: right forearm;                                 bb  

02:07 Follow up: Response: No adverse reaction                                                bb  

                                                                                                  

                                                                                                  

Disposition:                                                                                      

09:38 Co-signature as Attending Physician, Ian ALLEN I agree with the assessment and  ariadne 

      plan of care.                                                                               

                                                                                                  

Disposition:                                                                                      

19 00:28 Hospitalization ordered by Eliot Seals for Inpatient Admission. Preliminary     

  diagnosis are Gastrointestinal hemorrhage, unspecified, Anemia, unspecified.                    

- Bed requested for Telemetry/MedSurg (Inpatient).                                                

- Status is Inpatient Admission.                                                              bb  

- Condition is Stable.                                                                            

- Problem is new.                                                                                 

- Symptoms have improved.                                                                         

UTI on Admission? No                                                                              

                                                                                                  

                                                                                                  

                                                                                                  

Signatures:                                                                                       

Dispatcher [employer]                           EDMS                                                 

Rose Cox RN                        Ian Contreras MD MD cha Ballard, Brenda RN                     RN   Mayda Overton RN                       RN   ak1                                                  

Brad Shirley, NP                    NP   pm1                                                  

                                                                                                  

Corrections: (The following items were deleted from the chart)                                    

01:25 00:28 Hospitalization Ordered by Eliot Seals DO for Inpatient Admission. Preliminary    

      diagnosis is Gastrointestinal hemorrhage, unspecified; Anemia, unspecified. Bed             

      requested for Telemetry/MedSurg (Inpatient). Status is Inpatient Admission. Condition       

      is Stable. Problem is new. Symptoms have improved. UTI on Admission? No. pm1                

02:55 01:25 2019 00:28 Hospitalization Ordered by Eliot Seals DO for Inpatient        bb  

      Admission. Preliminary diagnosis is Gastrointestinal hemorrhage, unspecified; Anemia,       

      unspecified. Bed requested for Telemetry/MedSurg (Inpatient). Status is Inpatient           

      Admission. Condition is Stable. Problem is new. Symptoms have improved. UTI on              

      Admission? No. mw                                                                           

                                                                                                  

**************************************************************************************************

## 2019-08-21 NOTE — RAD REPORT
EXAM DESCRIPTION:  US - Extremity Venous Uni Ltd - 8/21/2019 2:14 am

 

CLINICAL HISTORY:  Swelling;Pain

Leg swelling and edema.

 

COMPARISON:  No comparisons

 

FINDINGS:  Right lower extremity venous system was interrogated with Doppler technique. Normal flow, 
compressibility and augmentation was noted. There is no DVT present.7 x 3 cm cystic structure is pres
ent in the right groin.

 

IMPRESSION:  No evidence of right lower extremity deep venous thrombosis.

## 2019-08-21 NOTE — ER
Nurse's Notes                                                                                     

 [facility]                                                                 

Name: Kyle Chan                                                                                  

Age: 64 yrs                                                                                       

Sex: Male                                                                                         

: 1955                                                                                   

MRN: [id number]                                                                                   

Arrival Date: 2019                                                                          

Time: 21:46                                                                                       

Account#: [id number]                                                                            

Bed 4                                                                                             

Private MD: Dustin Baugh R                                                         

Diagnosis: Gastrointestinal hemorrhage, unspecified;Anemia, unspecified                           

                                                                                                  

Presentation:                                                                                     

                                                                                             

22:14 Presenting complaint: Patient states: generalized weakness, fatigue since Monday. pt    ak1 

      has dialysis M/W/F. pt with tooth infection and taking amoxicillan and naproxen. pt         

      with sx to right leg on 19. pt c/o swelling to right leg. pt with follow up            

      appointment on 19. pt sees Dr. Perez for ESRD and Dr. Bradley as his PCP. Transition      

      of care: patient was not received from another setting of care. Onset of symptoms is        

      unknown. Risk Assessment: Do you want to hurt yourself or someone else? Patient reports     

      no desire to harm self or others. Initial Sepsis Screen: Does the patient meet any 2        

      criteria? No. Patient's initial sepsis screen is negative. Does the patient have a          

      suspected source of infection? No. Patient's initial sepsis screen is negative. Care        

      prior to arrival: None.                                                                     

22:14 Method Of Arrival: Wheelchair                                                           ak1 

22:14 Acuity: KJ 3                                                                           ak1 

                                                                                                  

Triage Assessment:                                                                                

22:17 General: Appears in no apparent distress. Behavior is calm, cooperative.                ak1 

                                                                                                  

Historical:                                                                                       

- Allergies:                                                                                      

22:17 No Known Allergies;                                                                     ak1 

- Home Meds:                                                                                      

22:17 gabapentin 100 mg oral cap 3 caps 3 times per day [Active];                             ak1 

- PMHx:                                                                                           

22:17 Diabetes - IDDM; Dialysis; Hyperlipidemia; Hypertension;                                ak1 

- PSHx:                                                                                           

22:17 left foot; right leg sx 19;                                                        ak1 

                                                                                                  

- Immunization history:: Adult Immunizations unknown.                                             

- Social history:: Smoking status: Patient/guardian denies using tobacco.                         

- Ebola Screening: : No symptoms or risks identified at this time.                                

                                                                                                  

                                                                                                  

Screenin:00 Abuse screen: Denies threats or abuse. Nutritional screening: No deficits noted.        bb  

      Tuberculosis screening: No symptoms or risk factors identified. Fall Risk None              

      identified.                                                                                 

                                                                                                  

Assessment:                                                                                       

22:00 General: Appears in no apparent distress. ill, Behavior is calm, cooperative. Pain:     bb  

      Complains of pain in right leg. Neuro: Level of Consciousness is awake, alert, obeys        

      commands, Oriented to person, place, time, situation. Cardiovascular: Heart tones S1 S2     

      present Capillary refill < 3 seconds Patient's skin is warm and dry. Edema is 2+ to         

      right ankle and right foot pitting to right ankle and right foot. Respiratory:              

      Respiratory effort is even, unlabored, Respiratory pattern is regular, Breath sounds        

      are clear bilaterally. GI: No signs and/or symptoms were reported involving the             

      gastrointestinal system. Derm: Skin is dry, Skin temperature is warm. Musculoskeletal:      

      Circulation, motion, and sensation intact. Reports swelling in right lower leg.             

23:37 Reassessment: BRUNO Shirley NP at bedside for discussion of findings and recommendations    bb  

      pt's HGB is low and he recommends a blood transfusion for a HGB of 4.1, HCT 12.8. Pt        

      verbalized understanding of and agrees to plan of care.                                     

                                                                                             

00:16 Reassessment: Patient is alert, oriented x 3, equal unlabored respirations, skin        bb  

      warm/dry/pink. BRUNO Shirley NP at bedside for discussion of need for admission pt              

      verbalized understanding of and agrees to plan of care.                                     

01:34 Reassessment: Patient is alert, oriented x 3, equal unlabored respirations, skin        bb  

      warm/dry/pink. pt resting quietly, IV sites intact, patent with no erythema or edema        

      noted, spouse at bedside, lab notified of request for blood products.                       

01:58 Reassessment: US at bedside for r/o DVT in right lower extremity. Pt is A\T\O x 4, resp   bb

      unlabored, IV sites intact, patent with blood and fluids infusing. Pt awaiting room         

      assignment family at bedside.                                                               

02:53 Reassessment: Patient is alert, oriented x 3, equal unlabored respirations, skin        bb  

      warm/dry/pink. pt transported via stretcher on monitor with blood infusing by this RN       

      hand off of care was given to Vishnu RN blood flow sheet signed by her. IV sites           

      intact, patent with fluids infusing.                                                        

                                                                                                  

Vital Signs:                                                                                      

                                                                                             

22:13  / 39; Pulse 76; Resp 16; Temp 98.1; Pulse Ox 100% on R/A; Weight 82.55 kg (R);   ak1 

      Height 5 ft. 8 in. (172.72 cm) (R); Pain 0/10;                                              

23:38  / 30; Pulse 57; Resp 18 S; Temp 98.3(O); Pulse Ox 100% on Nebulizer Mask;        bb  

                                                                                             

01:22  / 50; Pulse 55; Resp 16; Temp 97.9(O); Pulse Ox 100% on R/A;                     lp1 

01:57  / 57; Pulse 59; Resp 16 S; Temp 98.1(TE); Pulse Ox 100% on R/A;                  bb  

                                                                                             

22:13 Body Mass Index 27.67 (82.55 kg, 172.72 cm)                                             ak1 

                                                                                                  

ED Course:                                                                                        

                                                                                             

21:46 Patient arrived in ED.                                                                  cl3 

21:46 Dustin Baugh MD is Private Physician.                                    cl3 

22:00 Patient has correct armband on for positive identification. Placed in gown. Bed in low  bb  

      position. Call light in reach. Side rails up X2. Adult w/ patient. Cardiac monitor on.      

      Pulse ox on. NIBP on. Warm blanket given.                                                   

22:10 Brad Shirley NP is PHCP.                                                           pm1 

22:10 Ian Wilkins MD is Attending Physician.                                             pm1 

22:13 Arm band placed on Patient placed in an exam room, on a stretcher, on pulse oximetry,   ak1 

      Patient notified of wait time.                                                              

22:16 Triage completed.                                                                       ak1 

22:46 Inserted saline lock: 20 gauge in right forearm, using aseptic technique. Blood         ar5 

      collected.                                                                                  

23:22 Notified Nurse Practitioner and/or Physician Assistant of a critical lab result(s), Hgb lp1 

      4.1, Hct 12.8, Creatinine 6.38.                                                             

23:34 Thania Ray, RN is Primary Nurse.                                                   bb  

23:45 T\T\S collected, blood band applied to patient.                                           bb

                                                                                             

00:18 Consent for blood and/or blood product transfusion signed by spouse.                    bb  

00:28 Eliot Seals DO is Hospitalizing Provider.                                           pm1 

00:38 XRAY Chest (1 view) In Process Unspecified.                                             EDMS

01:20 Inserted saline lock: 22 gauge in right hand, using aseptic technique.                  bb  

01:33 Patient admitted, IV remains in place.                                                  bb  

01:33 No provider procedures requiring assistance completed.                                  bb  

                                                                                                  

Administered Medications:                                                                         

00:15 Drug: ProTONIX 80 mg Route: IVP; Site: right forearm;                                   bb  

01:15 Follow up: Response: No adverse reaction                                                bb  

00:15 Drug: ProTONIX 8 mg/hr Route: IV; Rate: 25 ml/hr; Site: right forearm;                  bb  

02:55 Follow up: IV Status: Infusion continued upon admission                                 bb  

01:32 Drug: Tylenol 650 mg Route: PO;                                                         bb  

02:07 Follow up: Response: No adverse reaction                                                bb  

01:32 Drug: Benadryl 12.5 mg Route: IVP; Site: right forearm;                                 bb  

02:07 Follow up: Response: No adverse reaction                                                bb  

                                                                                                  

                                                                                                  

Outcome:                                                                                          

00:16 Instructed on the need for admit.                                                       bb  

00:28 Decision to Hospitalize by Provider.                                                    pm1 

02:10 Admitted to Tele accompanied by nurse, family with patient, via stretcher, room 213,    bb  

      with chart, Report called to  Vishnu MARIANO                                                    

02:10 Condition: stable                                                                           

02:55 Patient left the ED.                                                                    bb  

                                                                                                  

Signatures:                                                                                       

Dispatcher [employer]                           Thania Resendiz RN                     RN   bb                                                   

Barbara Villafuerte, GARCÍA                         RN   lp1                                                  

Mayda Wood RN                       RN   ak1                                                  

Brad Shirley, NP                    NP   pm1                                                  

Azalia Darby                               ar5                                                  

Lex Hampton                                cl3                                                  

                                                                                                  

**************************************************************************************************

## 2019-08-21 NOTE — CON
Date of Consultation:  08/21/2019



Reason For Consultation:  Elevated BUN and creatinine, fluid management, end-
stage renal disease.



History Of Present Illness:  This is a pleasant 64-year-old gentleman, well 
known to me from the dialysis with significant past medical history of end-
stage renal disease, on hemodialysis, Monday, Wednesday, Friday at [facility], hypertension, hyperlipidemia, peripheral vascular disease, 
diabetes.  Patient was in his regular state of health.  Apparently came to the 
hospital complaining of black stool, weakness, tired, found to have significant 
anemia with hemoglobin down to the 4; for that reason, patient was admitted.  
Blood pressure was stable.  Patient received 1 unit of transfusion yesterday 
and today under the second unit.  Patient is on PPI and planned for EGD today.



Past Medical History:  

1.   Diabetes complicated with neuropathy and nephropathy.

2.   Hypertension.

3.   Hyperlipidemia.

4.   Peripheral vascular disease.

5.   End-stage renal disease, on hemodialysis, Monday, Wednesday, Friday.



Family History:  Positive for kidney disease.



Social History:  Denies smoking, denies drinking, denies drugs abuse.



Review of Systems:

Head and Neck:  No red eye.  No ear pain. 

GI:  Has black stool. 

:  No polyuria, no dysuria, no hematuria. 

GYN:  Not applicable. 

Respiratory:  No shortness of breath. 

Cardiovascular:  No chest pain. 

Endocrine:  No polydipsia. 

Skin:  No rash. 

Neuro:  Has neuropathy. 

Musculoskeletal:  Has leg pain.  Has weakness.



Physical Examination:

Vital Signs:  When I saw the patient, blood pressure 112/78, pulse of 88. 

Chest:  Clear to auscultation. 

Heart:  S1, S2.  Systolic murmur. 

Abdomen:  Soft, nontender. 

Extremities:  No edema on the left.  Has +1 edema on the right.



Laboratory Data:  H and H 5/15.1.  Sodium 139, potassium of 4.3, bicarb 25, BUN 
136, creatinine 6.3, calcium 8.5.  , B12 513.  INR 1.3.



Medications:  Current medications the patient on include pantoprazole, insulin, 
atorvastatin.



Assessment And Plan:  

1.   End-stage renal disease.  We will continue the patient on dialysis, 
especially with having significant uremia.

2.   Anemia of secondary to chronic kidney disease and gastrointestinal loss.  
We are going to start the patient on PPI.  We will transfuse the patient.  We 
will start ONESIMO and we will transfuse 2-pack RBC with dialysis today and we will 
follow up.

3.   Diabetes, as by primary.

4.   Gastrointestinal bleed, as above.  Follow up GI.  EGD today.





KATHLEEN

DD:  08/21/2019 11:08:39   Voice ID:  [id number]

DT:  08/21/2019 15:12:14   Report ID:  [id number]

[email or web address]

## 2019-08-21 NOTE — RAD REPORT
EXAM DESCRIPTION:  RAD - Chest Single View - 8/21/2019 12:08 am

 

CLINICAL HISTORY:  GI bleed

Chest pain.

 

COMPARISON:  No comparisons

 

FINDINGS:  Portable technique limits examination quality.

 

The lungs are grossly clear. The heart is mildly enlarged in size. No displaced fractures.

 

IMPRESSION:  No acute intrathoracic process suspected.

## 2019-08-21 NOTE — P.HP
Certification for Inpatient


With expected LOS: >2 Midnights


Patient will require the following post-hospital care: None


Practitioner: I am a practitioner with admitting privileges, knowledge of 

patient current condition, hospital course, and medical plan of care.


Services: Services provided to patient in accordance with Admission 

requirements found in Title 42 Section 412.3 of the Code of Federal Regulations





Patient History


Date of Service: 08/21/19


Reason for admission: Generalized weakness


History of Present Illness: 


64-year-old man with a history of end-stage renal disease on hemodialysis, 

peripheral vascular disease on aspirin and Plavix presented to the emergency 

department with complaint of generalized weakness.  Patient is reported to have 

fallen once at home due to weakness.  He reports dark stools which has been 

present for about 2 days.  He also reports vomiting coffee-ground material 

yesterday.  He denied any epigastric or abdominal pain.  He denied any bright 

red blood per rectum.


Patient underwent dialysis yesterday.


In the ED, his hemoglobin was down to 4, troponin negative, fecal occult blood 

test is positive.  Patient has severe symptomatic anemia secondary to GI bleed.

  He is admitted for further management.





Allergies





No Known Allergies Allergy (Verified 08/21/19 03:18)


 





Home Medications: 








Amoxicillin 500 mg PO Q8HR 08/21/19 


Aspirin [Adult Aspirin Regimen] 81 mg PO DAILY 08/21/19 


Brimonidine Tartrate/Timolol [Combigan 0.2%-0.5% Eye Drops] 1 drop OP BID 08/21/ 19 


Clopidogrel Bisulfate [Plavix] 75 mg PO DAILY 08/21/19 


Famotidine 40 mg PO DAILY 08/21/19 


Gabapentin 100 mg PO TID 08/21/19 


Labetalol HCl [Trandate] 600 mg PO BID 08/21/19 


Latanoprost/Pf [Latanoprost 0.005% Eye Drop] 1 drop OP BEDTIME 08/21/19 


Losartan Potassium 100 mg PO DAILY 08/21/19 


Lovastatin 20 mg PO BEDTIME 08/21/19 


Minoxidil 2.5 mg PO DAILY 08/21/19 


Naproxen 500 mg PO Q12HR PRN 08/21/19 


Nifedipine [Nifedipine ER] 60 mg PO DAILY 08/21/19 








- Past Medical/Surgical History


Diabetic: Yes


-: diabetes


-: ESRD


-: HTN


-: Vascular bypass surgery in the right lower extremity for PVD.





- Family History


  ** Mother


-: Kidney disease





- Social History


Smoking Status: Never smoker


Alcohol use: No


CD- Drugs: No


Place of Residence: Home





Review of Systems


Other: 


General:  No fever, no malaise, no unintentional weight loss.


Eyes:  No eye discharge, 


Respiratory:  No cough, no shortness of breath.


CVS:  No chest pain, no palpitation, no lightheadedness.


GI:  No abdominal pain, no nausea no vomit, no constipation, no diarrhea.


Genitourinary:  No dysuria, no urinary frequency, no incontinence, no hematuria.


Musculoskeletal:  No joint pains, or joint swelling, no gait instability.


Neurology:  No headache, no asymmetric weakness, no problem with swallowing.





Except as documented, all other systems reviewed and negative.








Physical Examination





- Physical Exam


General: Alert, In no apparent distress, Oriented x3


HEENT: Atraumatic, Normocephalic, PERRLA, Mucous membr. moist/pink


Neck: Supple, 2+ carotid pulse no bruit, JVD not distended, No Thyromegaly


Respiratory: Clear to auscultation bilaterally, Normal air movement


Cardiovascular: Normal pulses, Regular rate/rhythm, No murmurs, Edema (Right 

lower extremity)


Capillary refill: <2 Seconds


Gastrointestinal: Normal bowel sounds, Soft and benign, Non-distended, No 

tenderness, No masses


Musculoskeletal: No clubbing, No erythema, No warmth


Integumentary: Other (Small ulcer on the shin of the right lower extremity)


Neurological: Normal strength at 5/5 x4 extr, Cranial nerves 3-12 intact, 

Normal affect


Rectal: Deferred





- Studies


Laboratory Data (last 24 hrs)





08/21/19 00:05: PT 16.2 H, INR 1.39


08/20/19 22:46: Sodium 139, Potassium 4.3,  H, Creatinine 6.38 H*, 

Glucose 173 H, Total Bilirubin 1.8 H, AST 35, ALT 31, Alkaline Phosphatase 412 H


08/20/19 22:46: WBC 11.7 H, Hgb 4.1 L*, Hct 12.8 L*, Plt Count 180








Assessment and Plan





- Problems (Diagnosis)


(1) Upper GI bleed


Current Visit: Yes   Status: Acute   





(2) Acute blood loss anemia


Current Visit: Yes   Status: Acute   





(3) End-stage renal disease on hemodialysis


Current Visit: Yes   Status: Chronic   





(4) Peripheral vascular disease


Current Visit: Yes   Status: Chronic   





(5) Diabetes


Current Visit: No   Status: Chronic   


Qualifiers: 


   Diabetes mellitus type: type 2 





- Plan


Admit with tele


Transfuse 1 unit PRBC for now per Nephrology recommendation.  Patient would 

need additional PRBC transfusion but need to do this stepwise to avoid fluid 

overload


Monitor H&H q.6 hrs


IV proton.  Discontinue aspirin and plavix.


Keep NPO overnight.


GI consult


Nephrology consult for hemodialysis.


Insulin sliding scale for glucose management.


SCD for DVT prophylaxis


Discharge Plan: Home





- Advance Directives


Does patient have a Living Will: No


Does patient have a Durable POA for Healthcare: No





- Code Status/Comfort Care


Code Status Assessed: Yes


Code Status: Full Code


Time Spent Managing Pts Care (In Minutes): 67

## 2019-08-21 NOTE — P.PN
Subjective


Date of Service: 08/21/19


Primary Care Provider: Dr. Forte; Nephrology-Dr. Pavon; CV Surgery-Dr. Bridges


Chief Complaint: Generalized weakness


Subjective: Other (Patient still with some tiredness.  Overall stable.)





Physical Examination





- Vital Signs


Temperature: 97.9 F


Blood Pressure: 123/56


Pulse: 62


Respirations: 18


Pulse Ox (%): 100





- Physical Exam


General: Alert, In no apparent distress, Cooperative


HEENT: Atraumatic


Neck: Supple


Respiratory: Clear to auscultation bilaterally, Normal air movement


Cardiovascular: Normal pulses, Regular rate/rhythm


Gastrointestinal: Normal bowel sounds, Soft and benign, Non-distended, No 

tenderness, No masses, No rebound, No guarding


Musculoskeletal: No erythema, No tenderness, No warmth


Integumentary: No erythema, No warmth, No cyanosis


Neurological: Normal speech, Normal strength at 5/5 x4 extr, Normal tone, 

Normal affect





- Studies


Laboratory Data (last 24 hrs)





08/21/19 00:05: PT 16.2 H, INR 1.39


08/20/19 22:46: Sodium 139, Potassium 4.3,  H, Creatinine 6.38 H*, 

Glucose 173 H, Total Bilirubin 1.8 H, AST 35, ALT 31, Alkaline Phosphatase 412 H


08/20/19 22:46: WBC 11.7 H, Hgb 4.1 L*, Hct 12.8 L*, Plt Count 180





Medications List Reviewed: Yes





Assessment & Plan


Discharge Plan: Home


Plan to discharge in: 48 Hours


Physician Review Additional Text: 





Impression:


Melena secondary to severe symptomatic anemia related to upper GI bleed with 

prior use of nonsteroidal anti-inflammatories


End-stage renal disease on hemodialysis


Peripheral vascular disease with history of vascular bypass


Hypertension


Diabetes mellitus type 2 non insulin dependent


Chronic pain with diabetic neuropathy


GERD


Hyperlipidemia





Plan:


Melena secondary to severe symptomatic anemia related to upper GI bleed with 

prior use of nonsteroidal anti-inflammatories:  Patient stable this time.  

Patient has received 1 unit of blood.  He will receive 2 more units with 

dialysis.  Case discussed at length with nephrology and GI.  Patient currently 

NPO.  GI plans for upper endoscopy today to further evaluate and treat.  

Patient admits use of nonsteroidal anti-inflammatories recently.  Patient with 

chronic anemia.  Continue education on no further use of nonsteroidal anti-

inflammatories.  Anticipate discharge in the next 24-48 hr once clinically 

stable, hemoglobin stable.  


End-stage renal disease on hemodialysis:  Case discussed with nephrology.  

Patient will have dialysis today.  While in dialysis patient will receive 2 

more units of packed red blood cells.  Will continue and to monitor closely.


Peripheral vascular disease with history of vascular bypass:  Will hold aspirin 

and Plavix.  Pending findings on EGD, will need to discuss with GI and 

cardiovascular surgery about medications at discharge.


Hypertension:  Blood pressure low at this time.  Due to his anemia will hold 

blood pressure medication.  Will review and restart medication once blood 

pressure stable.


Diabetes mellitus type 2 non insulin dependent: Will monitor accuchecks and 

place on sliding scale. Check A1c. 


Chronic pain with diabetic neuropathy: Will review and restart medication.


GERD: Will start PPI. Await EGD. 


Hyperlipidemia: Will review and restart medication. 


Time Spent Managing Pts Care (In Minutes): 55

## 2019-08-21 NOTE — EKG
Test Date:    2019-08-20               Test Time:    23:48:36

Technician:   AER                                    

                                                     

MEASUREMENT RESULTS:                                       

Intervals:                                           

Rate:         61                                     

WV:           150                                    

QRSD:         102                                    

QT:           428                                    

QTc:          430                                    

Axis:                                                

P:            -48                                    

WV:           150                                    

QRS:          19                                     

T:            260                                    

                                                     

INTERPRETIVE STATEMENTS:                                       

                                                     

Sinus rhythm

ST & T wave abnormality, consider inferior ischemia

ST & T wave abnormality, consider anterolateral ischemia

Abnormal ECG

No previous ECG available for comparison



Electronically Signed On 08-21-19 07:44:55 CDT by Gilberto Crocker

## 2019-08-22 LAB
ALBUMIN SERPL BCP-MCNC: 2.8 G/DL (ref 3.4–5)
BUN BLD-MCNC: 77 MG/DL (ref 7–18)
GLUCOSE SERPLBLD-MCNC: 111 MG/DL (ref 74–106)
HCT VFR BLD CALC: 26.5 % (ref 39.6–49)
POTASSIUM SERPL-SCNC: 3.5 MMOL/L (ref 3.5–5.1)

## 2019-08-22 RX ADMIN — HUMAN INSULIN SCH: 100 INJECTION, SOLUTION SUBCUTANEOUS at 06:00

## 2019-08-22 RX ADMIN — SODIUM CHLORIDE SCH MLS: 0.9 INJECTION, SOLUTION INTRAVENOUS at 01:33

## 2019-08-22 RX ADMIN — HUMAN INSULIN SCH UNIT: 100 INJECTION, SOLUTION SUBCUTANEOUS at 12:06

## 2019-08-22 RX ADMIN — Medication SCH DROP: at 08:13

## 2019-08-22 RX ADMIN — LOSARTAN POTASSIUM SCH MG: 50 TABLET, FILM COATED ORAL at 08:12

## 2019-08-22 RX ADMIN — Medication SCH: at 21:00

## 2019-08-22 RX ADMIN — HYDRALAZINE HYDROCHLORIDE PRN MG: 20 INJECTION INTRAMUSCULAR; INTRAVENOUS at 04:30

## 2019-08-22 RX ADMIN — GABAPENTIN SCH MG: 100 CAPSULE ORAL at 21:14

## 2019-08-22 RX ADMIN — HUMAN INSULIN SCH: 100 INJECTION, SOLUTION SUBCUTANEOUS at 16:30

## 2019-08-22 RX ADMIN — SODIUM CHLORIDE SCH: 0.9 INJECTION, SOLUTION INTRAVENOUS at 06:00

## 2019-08-22 RX ADMIN — Medication SCH ML: at 21:16

## 2019-08-22 RX ADMIN — LABETALOL HYDROCHLORIDE SCH MG: 100 TABLET, FILM COATED ORAL at 21:13

## 2019-08-22 RX ADMIN — HUMAN INSULIN SCH: 100 INJECTION, SOLUTION SUBCUTANEOUS at 00:00

## 2019-08-22 RX ADMIN — LABETALOL HYDROCHLORIDE SCH MG: 100 TABLET, FILM COATED ORAL at 08:59

## 2019-08-22 RX ADMIN — Medication SCH ML: at 08:13

## 2019-08-22 RX ADMIN — HUMAN INSULIN SCH UNIT: 100 INJECTION, SOLUTION SUBCUTANEOUS at 21:16

## 2019-08-22 RX ADMIN — NIFEDIPINE SCH MG: 60 TABLET, FILM COATED, EXTENDED RELEASE ORAL at 08:12

## 2019-08-22 RX ADMIN — GABAPENTIN SCH MG: 100 CAPSULE ORAL at 08:59

## 2019-08-22 RX ADMIN — GABAPENTIN SCH MG: 100 CAPSULE ORAL at 14:23

## 2019-08-22 RX ADMIN — MINOXIDIL SCH MG: 2.5 TABLET ORAL at 08:12

## 2019-08-22 RX ADMIN — Medication SCH PKT: at 08:59

## 2019-08-22 RX ADMIN — Medication SCH DROP: at 21:15

## 2019-08-22 RX ADMIN — SODIUM CHLORIDE SCH MLS: 0.9 INJECTION, SOLUTION INTRAVENOUS at 15:08

## 2019-08-22 RX ADMIN — ATORVASTATIN CALCIUM SCH MG: 10 TABLET, FILM COATED ORAL at 21:14

## 2019-08-22 NOTE — OP
Surgeon:  Jared Oliveira MD



Procedure To Be Performed:  Esophagogastroduodenoscopy.



Indication For Procedure:  Melena, acute drop in hemoglobin, suspected upper GI bleed.



Plan For Anesthesia:  Monitored anesthesia care.



Complexity:  High due to probability of therapeutic intervention and patient's comorbidities.



Technique:  After obtaining informed consent from the patient and explaining risks and complications,
 which include, but are not limited to bleeding, infection, perforation, and anesthesia complication,
 patient was placed in a left lateral position and sedation was given.  From then on, the scope was a
dvanced to the mouth and carefully guided up to the second portion of the duodenum.  There was no act
martine bleeding seen.  The stigmata of recent bleeding were identified.  After the completion of the pro
cedure, the equipment and scope were withdrawn and procedure terminated in a safe manner.



Findings:  

1.Esophagus:  No gross lesion seen in the upper and mid esophagus.  In the distal esophagus, there w
as evidence of LA grade C esophagitis with ulceration.  Small biopsy was taken from the stomach.  Mil
d-to-moderate patchy erythema seen in the body and antrum.  Small biopsy was taken; however, 1 biopsy
 site did not stop bleeding even after waiting for 2-3 minutes.  Therefore, this was injected with ep
i and a clip was placed with good hemostasis.  Likely underlying platelet dysfunction due to kidney d
isease and the fact that patient is on Plavix as a contributing factor.

2.Duodenum:  Several superficial ulcers were seen in the bulb.  Biopsies were taken also.  Similar t
o the gastric biopsy, there was some oozing, which was stopped with epi injection and a single clip. 
 The second part of the duodenum appeared normal.



Complications:  None.



Tolerance To Anesthesia:  Excellent.



Postoperative Diagnosis:  Esophageal and duodenal ulcers.  Easy tendency to bleed with biopsy, which 
was treated.



Plan:  Continue current management.  IV PPI.  N.p.o. for now.  We will start diet from tomorrow.  Hol
d Plavix for an additional few days.  We will need to repeat EGD in 6-8 weeks to ensure healing.  Fol
low up biopsy results.





US/MODL

DD:  08/21/2019 14:54:12Voice ID:  [id number]

DT:  08/22/2019 00:27:16Report ID:  [id number]

## 2019-08-22 NOTE — PN
Date of Progress Note:  08/22/2019



Subjective:  Patient was admitted with GI bleed, symptomatic anemia status post EGD, transfusion of t
otal 3 units, tolerated well.



Physical Examination:

Vital Signs:  When I saw the patient, blood pressure of 184/77, pulse of 69, afebrile. 

Chest:  Clear to auscultation. 

Heart:  S1, S2.  Regular. 

Abdomen:  Soft, nontender. 

Extremity:  No edema.



Laboratory Data:  H and H 8.9/26.5.  Sodium of 139, potassium 3.9, bicarb 24, BUN 77, creatinine 5.1,
 calcium 8.4.  T-sat of 56.



Current Medications:  The patient on include Epogen, atorvastatin, labetalol, losartan, nifedipine, g
abapentin, Zofran.  Morphine, hydrocodone, tramadol.



Assessment And Plan:  

1.End-stage renal disease.  I going to go ahead and arrange for dialysis tomorrow.

2.Hypertension, not controlled.  We will go ahead and resume his minoxidil and we will follow up the
 patient.

3.Anemia secondary to chronic kidney disease/gastrointestinal bleed.  Continue ONESIMO and we will monit
or H and H for tomorrow and we will follow up.  Keep holding naproxen, aspirin and Plavix.  The patie
nt cleared from the renal standpoint for discharge planning hopefully if H and H stable tomorrow.





KATHLEEN

DD:  08/22/2019 13:09:30Voice ID:  [id number]

DT:  08/22/2019 14:52:29Report ID:  [id number]

## 2019-08-22 NOTE — P.PN
Subjective


Date of Service: 08/22/19


Primary Care Provider: Dr. Forte; Nephrology-Dr. Pavon; CV Surgery-Dr. Bridges


Chief Complaint: Generalized weakness


Subjective: Doing well, Other (Patient had slight confusion this morning when 

he woke up.  When I evaluated him he was doing well.  He recalls events 

yesterday.)





Physical Examination





- Vital Signs


Temperature: 98.2 F


Blood Pressure: 184/77


Pulse: 69


Respirations: 15


Pulse Ox (%): 99





- Physical Exam


General: Alert, In no apparent distress, Oriented x3, Cooperative


HEENT: Atraumatic


Neck: Supple


Respiratory: Clear to auscultation bilaterally, Normal air movement


Cardiovascular: Normal pulses, Regular rate/rhythm


Gastrointestinal: Normal bowel sounds, Soft and benign, Non-distended, No 

tenderness, No masses, No rebound, No guarding


Neurological: Normal speech, Normal strength at 5/5 x4 extr, Normal tone, 

Normal affect





- Studies


Medications List Reviewed: Yes





Assessment & Plan


Discharge Plan: Home


Plan to discharge in: 24 Hours


Physician Review Additional Text: 





Impression:


Melena secondary to severe symptomatic anemia related to upper GI bleed with 

prior use of nonsteroidal anti-inflammatories


End-stage renal disease on hemodialysis


Peripheral vascular disease with history of vascular bypass


Hypertension


Diabetes mellitus type 2 non insulin dependent


Chronic pain with diabetic neuropathy


GERD


Hyperlipidemia





Plan:


Melena secondary to severe symptomatic anemia related to upper GI bleed with 

prior use of nonsteroidal anti-inflammatories status post EGD showing 

esophageal and duodenal ulcers with treatment of epinephrine and clipping with 

good hemostasis:  Patient doing well this time.  Patient received multiple 

transfusions yesterday.  Hemoglobin now within normal range.  Will.  Patient 

had EGD showing esophageal and duodenal ulcers.  Biopsy was taken.  There was 

some mild oozing of blood from the biopsy site.  This resolved after injection 

with epinephrine and clip placed.  Will have physical therapy work with patient 

today.  Will recheck hemoglobin later today.  Will slowly advance diet.  Will 

start with clear liquid and advanced to GI soft over the next 24 hr.  Continue 

to educate on further use of nonsteroidal anti-inflammatories.  Case discussed 

with GI at length yesterday.  Patient will hold aspirin/Plavix for at least 5 

days.  Both medication can be restarted for his peripheral vascular disease 

after 5 days.  Patient will need continue with Protonix daily.  Will discuss 

with nephrology.  Anticipate discharge within the next 24 hr if clinically 

stable.


End-stage renal disease on hemodialysis:  Patient received dialysis every Monday

, Wednesday and Friday.  Will discuss further with nephrology about plan of 

care.  Possible discharge as early as today but within 24 hr with stability and 

tolerance of diet.


Peripheral vascular disease with history of vascular bypass:  GI recommends to 

hold aspirin/Plavix for at least 5 days.  Both medication can then be 

restarted.  Patient will need a follow up with CV surgery as an outpatient.


Hypertension:  Home medication restarted.  Will continue to adjust medication..


Diabetes mellitus type 2 non insulin dependent: Will monitor accuchecks and 

place on sliding scale.  


Chronic pain with diabetic neuropathy:  Continue medication.


GERD:  Continue as above. 


Hyperlipidemia:  Continue medication. 


Time Spent Managing Pts Care (In Minutes): 55

## 2019-08-23 VITALS — TEMPERATURE: 97.4 F

## 2019-08-23 VITALS — SYSTOLIC BLOOD PRESSURE: 144 MMHG | DIASTOLIC BLOOD PRESSURE: 67 MMHG

## 2019-08-23 VITALS — OXYGEN SATURATION: 98 %

## 2019-08-23 LAB
ALBUMIN SERPL BCP-MCNC: 2.6 G/DL (ref 3.4–5)
BUN BLD-MCNC: 89 MG/DL (ref 7–18)
GLUCOSE SERPLBLD-MCNC: 109 MG/DL (ref 74–106)
HCT VFR BLD CALC: 25.9 % (ref 39.6–49)
POTASSIUM SERPL-SCNC: 3.9 MMOL/L (ref 3.5–5.1)

## 2019-08-23 RX ADMIN — SODIUM CHLORIDE SCH MLS: 0.9 INJECTION, SOLUTION INTRAVENOUS at 02:04

## 2019-08-23 RX ADMIN — LOSARTAN POTASSIUM SCH MG: 50 TABLET, FILM COATED ORAL at 09:03

## 2019-08-23 RX ADMIN — HUMAN INSULIN SCH UNIT: 100 INJECTION, SOLUTION SUBCUTANEOUS at 11:30

## 2019-08-23 RX ADMIN — HUMAN INSULIN SCH UNIT: 100 INJECTION, SOLUTION SUBCUTANEOUS at 09:02

## 2019-08-23 RX ADMIN — GABAPENTIN SCH MG: 100 CAPSULE ORAL at 14:59

## 2019-08-23 RX ADMIN — LABETALOL HYDROCHLORIDE SCH MG: 100 TABLET, FILM COATED ORAL at 09:04

## 2019-08-23 RX ADMIN — GABAPENTIN SCH MG: 100 CAPSULE ORAL at 09:04

## 2019-08-23 RX ADMIN — Medication SCH ML: at 09:04

## 2019-08-23 RX ADMIN — MINOXIDIL SCH MG: 2.5 TABLET ORAL at 09:04

## 2019-08-23 RX ADMIN — Medication SCH: at 09:00

## 2019-08-23 RX ADMIN — NIFEDIPINE SCH MG: 60 TABLET, FILM COATED, EXTENDED RELEASE ORAL at 09:03

## 2019-08-23 RX ADMIN — EPOETIN ALFA-EPBX SCH UNIT: 10000 INJECTION, SOLUTION INTRAVENOUS; SUBCUTANEOUS at 12:37

## 2019-08-23 RX ADMIN — SODIUM CHLORIDE SCH: 0.9 INJECTION, SOLUTION INTRAVENOUS at 10:30

## 2019-08-23 RX ADMIN — Medication SCH DROP: at 09:05

## 2019-08-23 NOTE — P.PN
Subjective


Date of Service: 08/23/19


Primary Care Provider: Dr. Forte; Nephrology-Dr. Pavon; CV Surgery-Dr. Bridges


Chief Complaint: Generalized weakness


Subjective: No new changes





Pt with esrd , admitted for acute anemia 


Due to Gi bleeding 








today 


no new complaints 


LE doppler , hematoma smaller in Size 


H/H stable 


can be discharged todat after HD 


F/U with vascular as an OP 





Physical Examination





- Vital Signs


Temperature: 97.4 F


Blood Pressure: 144/67


Pulse: 54


Respirations: 18


Pulse Ox (%): 100





- Physical Exam


General: In no apparent distress, Oriented x3


HEENT: Atraumatic


Neck: Supple, Without JVD or thyroid abnormality


Respiratory: Clear to auscultation bilaterally, Normal air movement


Cardiovascular: No edema, Normal pulses, Regular rate/rhythm, No gallops, No 

rubs, No murmurs


Gastrointestinal: Normal bowel sounds


Musculoskeletal: Other (Rt upper thigh nodule, non tender )





- Studies


Medications List Reviewed: Yes





Assessment And Plan





- Plan





End-stage renal disease. 


HD today 


can be discharged after HD tosday 


]


Anemia 


Due to CKD and acute Gi bleeding 


H/h stable 


cont ONESIMO 








HTN 


controlled 








Rt upper thigh hematoma 


stable 


F/u with his vascular surgeon as an op

## 2019-08-23 NOTE — P.DS
Admission Date: 08/21/19


Discharge Date: 08/23/19


Primary Care Provider: Dr. Forte; Nephrology-Dr. Pavon; CV Surgery-Dr. Bridges


Disposition: ROUTINE DISCHARGE


Discharge Condition: GOOD


Reason for Admission: Generalized weakness


Consultations: 





Nephrology-Dr. Pavon/Dr. Mireles


GI-Dr. Oliveira





Procedures: 





Venous doppler: 


FINDINGS:  Right lower extremity venous system was interrogated with Doppler 

technique. Normal flow, compressibility and augmentation was noted. There is no 

DVT present.  7 x 3 cm cystic structure is present in the right groin. 


IMPRESSION:  No evidence of right lower extremity deep venous thrombosis.





Arterial doppler: 


ADDENDUM   On the August 21, 2019 examination the fluid collection measured 7 x 

4.5 centimeters


FINDINGS:  A 4.8 x 2.5 centimeter fluid collection is present within the right 

groin. It does not contain blood flow. It lies near the right common femoral 

artery. The artery is patent. 


IMPRESSION:  4.8 x 2.5 centimeter fluid collection which does not contain blood 

flow





GI intervention: 


Surgeon:  Jared Oliveira MD


Procedure To Be Performed:  Esophagogastroduodenoscopy.


Indication For Procedure:  Melena, acute drop in hemoglobin, suspected upper GI 

bleed.


Technique:  After obtaining informed consent from the patient and explaining 

risks and complications, which include, but are not limited to bleeding, 

infection, perforation, and anesthesia complication, patient was placed in a 

left lateral position and sedation was given.  From then on, the scope was 

advanced to the mouth and carefully guided up to the second portion of the 

duodenum.  There was no active bleeding seen.  The stigmata of recent bleeding 

were identified.  After the completion of the procedure, the equipment and 

scope were withdrawn and procedure terminated in a safe manner.


Findings:  


1.   Esophagus:  No gross lesion seen in the upper and mid esophagus.  In the 

distal esophagus, there was evidence of LA grade C esophagitis with ulceration.

  Small biopsy was taken from the stomach.  Mild-to-moderate patchy erythema 

seen in the body and antrum.  Small biopsy was taken; however, 1 biopsy site 

did not stop bleeding even after waiting for 2-3 minutes.  Therefore, this was 

injected with epi and a clip was placed with good hemostasis.  Likely 

underlying platelet dysfunction due to kidney disease and the fact that patient 

is on Plavix as a contributing factor.


2.   Duodenum:  Several superficial ulcers were seen in the bulb.  Biopsies 

were taken also.  Similar to the gastric biopsy, there was some oozing, which 

was stopped with epi injection and a single clip.  The second part of the 

duodenum appeared normal.


Complications:  None.


Tolerance To Anesthesia:  Excellent.


Postoperative Diagnosis:  Esophageal and duodenal ulcers.  Easy tendency to 

bleed with biopsy, which was treated.





Biopsy Pathology: 


Duodenal ulcer, biopsy:  Ulcer noted, no malignancy identified, no H. pylori 

organism identified.


Stomach, biopsy:  Mild chronic inactive gastritis, no H pylori organism 

identified. No evidence of malignancy.


Esophagus ulcer, biopsy:  Mild acute esophagitis, no malignancy identified. 

Special GMS stain negative for fungal organism.





Medical Problem List: 


Melena secondary to severe symptomatic acute anemia related to upper GI bleed 

with prior use of nonsteroidal anti-inflammatories status post EGD showing 

esophageal and duodenal ulcers with treatment of epinephrine and clipping with 

good hemostasis, pathology showing chronic gastritis, acute esophagitis


End-stage renal disease on hemodialysis


Peripheral vascular disease with history of vascular bypass


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


Hypertension


Chronic pain with diabetic neuropathy


GERD


Hyperlipidemia





Brief History of Present Illness: 





64-year-old  male with multiple medical problems including end-stage 

renal disease on hemodialysis, diabetes, hypertension, and right groin 

pseudoaneurysm status post recent repair.  Patient presented with increased 

fatigue and melena.  Patient found to be acutely anemic.  Upper GI bleed was 

suspected.  Patient was admitted for further evaluation.


Hospital Course: 





Patient presented with melena and severe anemia.  Patient admitted for further 

evaluation of suspected upper GI bleed.  Patient had been using nonsteroidal 

anti-inflammatories.  He also was taking Plavix for peripheral vascular 

disease. Patient required blood transfusion.  Patient received 4 units of 

blood.  Patient was seen by GI.  GI intervention was required.  EGD performed 

showed esophageal and duodenal ulcers.  This was treated with epinephrine and 

clipping with good hemostasis.  Pathology showed chronic gastritis and acute 

esophagitis.  GI recommends to hold aspirin and Plavix for 5 days.  Patient 

will continue with Protonix 40 mg daily.  Initial hemoglobin 4.1.  Now stable 

at 8.8.  At discharge will continue with Protonix 40 mg daily.  At discharge 

will recommend to hold aspirin and Plavix for 5 days.  Then aspirin and Plavix 

can be restarted after that time.  Recommend follow up with GI in 1-2 weeks to 

follow up this hospitalization.  Recommend recheck CBC in 1 week to monitor 

stability.  Patient may continue with multi vitamin with iron daily.  Will 

recommend no further use of nonsteroidal anti-inflammatories like naproxen.  

Patient had been using naproxen in the past for pain.





Patient seen by CV surgery at [facility] by Dr. Bridges.  Patient with 

history of open repair of SMA pseudoaneurysm in July of 2019. This has been 

followed by CV surgery.  Venous and arterial doppler performed during his stay.

  Pseudo aneurysm measures 4.8 x 2.5 cm.  This appears stable.  Information 

will be sent to Dr. Bridges(Fax: [phone]).  Patient has a repeat appointment on 

September 12 for repeat ultrasound to monitor closely.  He has follow up with 

Dr. Bridges on September 19th to further address.





Patient with end-stage renal disease on hemodialysis.  Patient seen by 

nephrology during his stay.  Patient will continue with dialysis every Monday, 

Wednesday and Friday.  Overall stable at this time.  Recommend no further use 

of nonsteroidal anti-inflammatories.  Future medications will need to be 

renally dosed.





Patient with hypertension.  This has remained stable.  Patient will continue 

with medications-labetalol 600 mg 1 pill twice daily, losartan 100 mg daily, 

minoxidil 2.5 mg daily, and nifedipine ER 60 mg daily.  Recommend to maintain 

blood pressures less 150/80.  Further adjustment can be done by his PCP.





Patient with chronic pain and neuropathy.  At discharge  he will continue this 

medication-gabapentin 100 mg 1 pill 3 times a day.  Recommend no further use of 

nonsteroidal anti-inflammatories like naproxen.





Patient with hyperlipidemia.  At discharge he will continue with his medication-

lovastatin 20 mg daily.


Vital Signs/Physical Exam: 














Temp Pulse Resp BP Pulse Ox


 


 97.4 F   54   18   144/67 H  100 


 


 08/23/19 08:00  08/23/19 09:04  08/23/19 08:00  08/23/19 09:04  08/23/19 08:00








General: Alert, In no apparent distress, Oriented x3, Cooperative


HEENT: Atraumatic


Neck: Supple


Respiratory: Clear to auscultation bilaterally, Normal air movement


Cardiovascular: Normal pulses, Regular rate/rhythm


Gastrointestinal: Normal bowel sounds, Soft and benign, Non-distended


Musculoskeletal: No tenderness, No warmth


Integumentary: Other (Mass to the right groin area suspect sooner aneurysm as 

previous history indicates)


Neurological: Normal speech, Normal strength at 5/5 x4 extr, Normal tone, 

Normal affect


Laboratory Data at Discharge: 














WBC  11.7 K/uL (4.3-10.9)  H  08/20/19  22:46    


 


Hgb  8.8 g/dL (13.6-17.9)  L  08/23/19  09:15    


 


Hct  25.9 % (39.6-49.0)  L  08/23/19  09:15    


 


Plt Count  180 K/uL (152-406)   08/20/19  22:46    


 


PT  16.2 SECONDS (9.5-12.5)  H  08/21/19  00:05    


 


INR  1.39   08/21/19  00:05    


 


Sodium  137 mmol/L (136-145)   08/23/19  05:20    


 


Potassium  3.9 mmol/L (3.5-5.1)   08/23/19  05:20    


 


BUN  89 mg/dL (7-18)  H  08/23/19  05:20    


 


Creatinine  6.45 mg/dL (0.55-1.3)  H* D 08/23/19  05:20    


 


Glucose  109 mg/dL ()  H  08/23/19  05:20    


 


Phosphorus  4.1 mg/dL (2.5-4.9)   08/23/19  05:20    


 


Total Bilirubin  1.8 mg/dL (0.2-1.0)  H  08/20/19  22:46    


 


AST  35 U/L (15-37)   08/20/19  22:46    


 


ALT  31 U/L (12-78)   08/20/19  22:46    


 


Alkaline Phosphatase  412 U/L ()  H  08/20/19  22:46    








Home Medications: 








Amoxicillin 500 mg PO Q8HR 08/21/19 


Aspirin [Adult Aspirin Regimen] 81 mg PO DAILY 08/21/19 


Brimonidine Tartrate/Timolol [Combigan 0.2%-0.5% Eye Drops] 1 drop OP BID 08/21/ 19 


Clopidogrel Bisulfate [Plavix] 75 mg PO DAILY 08/21/19 


Gabapentin 100 mg PO TID 08/21/19 


Labetalol HCl [Trandate] 600 mg PO BID 08/21/19 


Latanoprost/Pf [Latanoprost 0.005% Eye Drop] 1 drop OP BEDTIME 08/21/19 


Losartan Potassium 100 mg PO DAILY 08/21/19 


Lovastatin 20 mg PO BEDTIME 08/21/19 


Minoxidil 2.5 mg PO DAILY 08/21/19 


Nifedipine [Nifedipine ER] 60 mg PO DAILY 08/21/19 


Multivit with Iron,Minerals [Spectravite Senior] 1 each PO DAILY #90 tablet 08/ 23/19 


Pantoprazole [Protonix  Tab] 40 mg PO DAILY #30 tab 08/23/19 





New Medications: 


Multivit with Iron,Minerals [Spectravite Senior] 1 each PO DAILY #90 tablet


Pantoprazole [Protonix  Tab] 40 mg PO DAILY #30 tab


Patient Discharge Instructions: 1.  Recommend follow up with his PCP in 1 week 

to follow up this hospitalization.  2.  Patient presented with melena and 

severe anemia.  Patient admitted for further evaluation of suspected upper GI 

bleed.  Patient had been using nonsteroidal anti-inflammatories.  He also was 

taking Plavix for peripheral vascular disease. Patient required blood 

transfusion.  Patient received 4 units of blood.  Patient was seen by GI.  GI 

intervention was required.  EGD performed showed esophageal and duodenal 

ulcers.  This was treated with epinephrine and clipping with good hemostasis.  

Pathology showed chronic gastritis and acute esophagitis.  GI recommends to 

hold aspirin and Plavix for 5 days.  Patient will continue with Protonix 40 mg 

daily.  Initial hemoglobin 4.1.  Now stable at 8.8.  At discharge will continue 

with Protonix 40 mg daily.  At discharge will recommend to hold aspirin and 

Plavix for 5 days.  Then aspirin and Plavix can be restarted after that time.  

Recommend follow up with GI in 1-2 weeks to follow up this hospitalization.  

Recommend recheck CBC in 1 week to monitor stability.  Patient may continue 

with multi vitamin with iron daily.  Will recommend no further use of 

nonsteroidal anti-inflammatories like naproxen.  Patient had been using 

naproxen in the past for pain.  3.  Patient seen by CV surgery at [facility] by Dr. Bridges.  Patient with history of open repair of SMA pseudoaneurysm 

in July of 2019. This has been followed by CV surgery.  Venous and arterial 

doppler performed during his stay.  Pseudo aneurysm measures 4.8 x 2.5 cm.  

This appears stable.  Information will be sent to Dr. Bridges(Fax: [phone]).  

Patient has a repeat appointment on September 12 for repeat ultrasound to 

monitor closely.  He has follow up with Dr. Bridges on September 19th to further 

address.  4.  Patient with end-stage renal disease on hemodialysis.  Patient 

seen by nephrology during his stay.  Patient will continue with dialysis every 

Monday, Wednesday and Friday.  Overall stable at this time.  Recommend no 

further use of nonsteroidal anti-inflammatories.  Future medications will need 

to be renally dosed.  5.  Patient with hypertension.  This has remained stable.

  Patient will continue with medications-labetalol 600 mg 1 pill twice daily, 

losartan 100 mg daily, minoxidil 2.5 mg daily, and nifedipine ER 60 mg daily.  

Recommend to maintain blood pressures less 150/80.  Further adjustment can be 

done by his PCP.  6.  Patient with chronic pain and neuropathy.  At discharge  

he will continue this medication-gabapentin 100 mg 1 pill 3 times a day.  

Recommend no further use of nonsteroidal anti-inflammatories like naproxen.  7.

  Patient with hyperlipidemia.  At discharge he will continue with his 

medication-lovastatin 20 mg daily.


Diet: Renal


Activity: Fall precautions


Time spent managing pt's care (in minutes): 55

## 2019-08-23 NOTE — RAD REPORT
EXAM DESCRIPTION:  US - Lower Extremity Artery [employer] - 8/23/2019 8:39 am

 

CLINICAL HISTORY:  Right leg pain and swelling

 

COMPARISON:  None

 

FINDINGS:  A 4.8 x 2.5 centimeter fluid collection is present within the right groin. It does not con
tain blood flow. It lies near the right common femoral artery. The artery is patent.

 

IMPRESSION:  4.8 x 2.5 centimeter fluid collection which does not contain blood flow may represent a 
hematoma